# Patient Record
Sex: MALE | Race: ASIAN | NOT HISPANIC OR LATINO | Employment: FULL TIME | URBAN - METROPOLITAN AREA
[De-identification: names, ages, dates, MRNs, and addresses within clinical notes are randomized per-mention and may not be internally consistent; named-entity substitution may affect disease eponyms.]

---

## 2018-06-04 ENCOUNTER — OFFICE VISIT CONVERTED (OUTPATIENT)
Dept: OTHER | Facility: HOSPITAL | Age: 53
End: 2018-06-04
Attending: INTERNAL MEDICINE

## 2019-07-20 ENCOUNTER — HOSPITAL ENCOUNTER (OUTPATIENT)
Dept: OTHER | Facility: HOSPITAL | Age: 54
Discharge: HOME | End: 2019-07-20
Attending: INTERNAL MEDICINE

## 2019-07-20 LAB
ANION GAP SERPL CALC-SCNC: 16 MMOL/L (ref 8–19)
BUN SERPL-MCNC: 18 MG/DL (ref 5–25)
BUN/CREAT SERPL: 20 {RATIO} (ref 6–20)
CALCIUM SERPL-MCNC: 9.1 MG/DL (ref 8.7–10.4)
CHLORIDE SERPL-SCNC: 104 MMOL/L (ref 99–111)
CHOLEST SERPL-MCNC: 186 MG/DL (ref 107–200)
CHOLEST/HDLC SERPL: 4.8 {RATIO} (ref 3–6)
CONV CO2: 25 MMOL/L (ref 22–32)
CREAT UR-MCNC: 0.9 MG/DL (ref 0.7–1.2)
EST. AVERAGE GLUCOSE BLD GHB EST-MCNC: 128 MG/DL
GFR SERPLBLD BASED ON 1.73 SQ M-ARVRAT: >60 ML/MIN/{1.73_M2}
GLUCOSE SERPL-MCNC: 123 MG/DL (ref 70–99)
HBA1C MFR BLD: 6.1 % (ref 3.5–5.7)
HDLC SERPL-MCNC: 39 MG/DL (ref 40–60)
LDLC SERPL CALC-MCNC: 100 MG/DL (ref 70–100)
OSMOLALITY SERPL CALC.SUM OF ELEC: 295 MOSM/KG (ref 273–304)
POTASSIUM SERPL-SCNC: 4.1 MMOL/L (ref 3.5–5.3)
SODIUM SERPL-SCNC: 141 MMOL/L (ref 135–147)
TRIGL SERPL-MCNC: 234 MG/DL (ref 40–150)
VLDLC SERPL-MCNC: 47 MG/DL (ref 5–37)

## 2019-07-30 ENCOUNTER — OFFICE VISIT CONVERTED (OUTPATIENT)
Dept: OTHER | Facility: HOSPITAL | Age: 54
End: 2019-07-30
Attending: INTERNAL MEDICINE

## 2021-05-28 VITALS
RESPIRATION RATE: 16 BRPM | TEMPERATURE: 98.5 F | HEART RATE: 57 BPM | WEIGHT: 154.5 LBS | DIASTOLIC BLOOD PRESSURE: 69 MMHG | BODY MASS INDEX: 26.38 KG/M2 | HEIGHT: 64 IN | TEMPERATURE: 98.2 F | OXYGEN SATURATION: 96 % | OXYGEN SATURATION: 97 % | BODY MASS INDEX: 26.35 KG/M2 | RESPIRATION RATE: 18 BRPM | SYSTOLIC BLOOD PRESSURE: 114 MMHG | SYSTOLIC BLOOD PRESSURE: 120 MMHG | HEIGHT: 64 IN | WEIGHT: 154.37 LBS | HEART RATE: 58 BPM | DIASTOLIC BLOOD PRESSURE: 73 MMHG

## 2021-05-28 NOTE — PROGRESS NOTES
Patient: NHUNG NULL     Acct: [id number]     Report: #[id number]  UNIT #: [id number]     : 1965    Encounter Date:2019  PRIMARY CARE:   ***Signed***  --------------------------------------------------------------------------------------------------------------------  Progress Note      DATE: 19      PCP Not Found in Lookup:  None      Referring Provider:  SELF,REFERRED      Chief Complaint      Follow-up Visit            Subjective      53-year-old Serbian male comes in for yearly checkup.  Patient has history of     hyperglycemia and at one point was treated with glyburide for diabetes mellitus.     Patient also suffers from hyperlipidemia.      Patient offers no complaints.  He would occasionally have some aches and pains     secondary to his work.            Past Med/Surg History            Past Med/Surg History:   No Hypertension             No Diabetes Mellitus             No Heart Disease             No Cancer             Other (Prediabetic)            Social History      Social History:  No Tobacco Use, No Alcohol Use, No Recreational Drug use; Other    (lives with his wife)            Allergies      Coded Allergies:             NO KNOWN ALLERGIES (Unverified , 17)            Medications      Medications    Last Reconciled on 19 16:51 by RIKA NORTON      No Active Prescriptions or Reported Meds      Current Medications      Current Medications Reviewed 19            Pain Assessment      Pain Intensity:  0      Description:  None            Review of Systems      General:  No Fatigue Scale:, No Pain Scale:      HEENT:  No Dysphagia, No Hearing Changes, No Visual Changes      Respiratory:  No Cough, No Shortness of Air      Cardiovascular:  No Chest Pain, No Palpitations      Gastrointestinal:  No Nausea, No Vomiting, No Constipation, No Diarrhea;     Appetite Good      Genitourinary:  No Nocturia, No Dysuria      Musculoskeletal:  No Aches, No Pains       Endocrine:  No Heat Intolerance, No Fatigue      Hematologic:  No Bleeding, No Bruising      Allergic/Immunologic:  No Hives, No Nasal drip      Psychological:  No Anxiety, No Depression      Neurological:  No Headaches, No Dizziness      Skin:  No Rash, No Edema            Vitals      Height 5 ft 4 in / 162.56 cm      Weight 154 lbs 6 oz / 70.549004 kg      BSA 1.75 m2      BMI 26.5 kg/m2      Temperature 98.2 F / 36.78 C      Pulse 57      Respirations 18      Blood Pressure 120/73      Pulse Oximetry 96%            Exam      Constitutional:  No acute distress, Conversant, Pleasant      Eyes:  Anicteric sclerae, Palpebral Conjunctivae (Pink), KEATON      HENT:  Oropharynx clear; No Erythema; Buccal mucosae (pink)      Neck:  Supple, Full Range of Motion      Cardiovascular:  RRR; No Murmurs; Normal PMI; No Peripheral Edema      Lungs:  Clear to Ausculation, Normal Respiratory Effort      Abdomen:  Soft, NABS; No Tenderness      Chest:  Other (Symmetrical and equal)      Lymphatic:  No Neck, No Axillae      Extremities:  No digital cyanosis, No digital ischemia, Normal gait, Other (No     deformity)      Neurological:  Cranial Nerve II-XII (Intact); No Focal Sensory deficits      Psychological:  Appropriate affect, Appropriate mood, Intact judgement, Alert      Skin:  Normal temperature, Other (No dermatosis)            Lab Results      Laboratory Tests      7/20/19 06:25            Laboratory Tests            Test       7/20/19      06:25 7/22/19      21:57             Sodium Level       141 mmol/L      (135-147)                    Potassium Level       4.1 mmol/L      (3.5-5.3)                    Chloride Level       104 mmol/L      ()                    Carbon Dioxide Level       25 mmol/L      (22-32)                    Anion Gap       16 mmol/L      (8-19)                    Blood Urea Nitrogen       18 mg/dL      (5-25)                    Creatinine       0.90 mg/dL      (0.70-1.20)                     Glomerular Filtration Rate      Calc >60      mL/min/{1.73_m2}                    BUN/Creatinine Ratio       20 {ratio}      (6-20)                    Glucose Level       123 mg/dL      (70-99)                    Hemoglobin A1c       6.1 %      (3.5-5.7)                    Estimated Average Glucose      (eAG) 128 mg/dL                           Serum Osmolality 295 (273-304)               Calcium Level       9.1 mg/dL      (8.7-10.4)                    Triglycerides Level       234 mg/dL      ()                    Cholesterol Level       186 mg/dL      (107-200)                    LDL Cholesterol       100 mg/dL      ()                    VLDL Cholesterol       47 mg/dL      (5-37)                    HDL Cholesterol       39 mg/dL      (40-60)                    Cholesterol/HDL Ratio       4.8 NA      (3.0-6.0)                    Lab Scanned Report              LAB FINAL      CUMULATIVES -            Impression/Problem List            Hyperglycemia high risk for diabetes mellitus      Hyper triglyceridemia            Plan      Low carbohydrate diet -discussed with him that bread, rice, spaghetti are all     carbohydrates and that he should eat less of these.      Exercise      Lose weight      Yearly checkup            Patient Education:        General Wellness (Alternative Therapy)            PREVENTION      Influenza Vaccine Declined:  No      2 or More Falls Past Year?:  No      Fall Past Year with Injury?:  No      Encouraged to follow-up with:  PCP regarding preventative exams.      Chart initiated by      REANNA Vincent RN                 Disclaimer: Converted document may not contain table formatting or lab diagrams. Please see [employer] for the authenticated document.

## 2021-05-28 NOTE — PROGRESS NOTES
Patient: NHUNG NULL     Acct: [id number]     Report: #[id number]  UNIT #: [id number]     : 1965    Encounter Date:2018  PRIMARY CARE:   ***Signed***  --------------------------------------------------------------------------------------------------------------------  Progress Note      DATE: 18      PCP Not Found in Lookup:  None      Referring Provider:  SELF,REFERRED PATIENT      Chief Complaint      Follow up Hyperglycemia            Subjective      52-year-old Italian male comes in for follow-up of hyperglycemia and     hyperlipidemia.  Patient had refused medications for his hyperlipidemia and     went on a diet.  Patient was on glyburide 2.5 mg for hyperglycemia which he     took according to his blood sugar.  When his blood sugar is 90, patient does     not take his glyburide.            Past Med/Surg History            Past Med/Surg History:   No Hypertension             No Diabetes Mellitus             No Heart Disease             No Cancer            Social History      Social History:  No Tobacco Use, No Alcohol Use, No Recreational Drug use,     Other (lives with his wife)            Allergies      Coded Allergies:             NO KNOWN ALLERGIES (Unverified , 17)            Medications      Medications    Last Reconciled on 17 19:20 by CATARINO ROUSE MD      glyBURIDE (glyBURIDE) 2.5 Mg Tablet      2.5 MG PO Q2D, #30 TAB 0 Refills         Reported         18      Current Medications      Current Medications Reviewed 18            Pain Assessment      Pain Intensity:  0      Description:  None            Review of Systems      General:  No Anxiety, No Fatigue Scale:, No Pain Scale:, No Fever, No Other      HEENT:  No Dysphagia, No Hearing Changes, No Rhinorrhea, No Tinnitus, No Visual     Changes, No Nasal Congestion, No Epistaxis, No Other      Respiratory:  No Cough, No Shortness of Air, No Sputum Changes, No Wheezing, No     Hemoptysis, No Congestion, No Other       Cardiovascular:  No Chest Pain, No Pedal Edema, No Orthopnea, No Palpitations,     No Chest Pressure, No Dizziness, No Other      Gastrointestinal:  No Nausea, No Vomiting, No Dysphagia, No Constipation, No     Diarrhea, Appetite Good, No Appetite Fair, No Appetite Poor, No Early Satiety,     No Other      Genitourinary:  No Nocturia, No Dysuria, No Other      Musculoskeletal:  No Joint Effusions, No Joint Tenderness, No Joint Stiffness,     No Myalgias, No Aches, No Pains, No Other      Endocrine:  No Heat Intolerance, No Cold Intolerance, No Fatigue, No Blood     Sugar Control, No Other      Hematologic:  No Bleeding, No Bruising, No Swollen Glands, No Other      Allergic/Immunologic:  No Hives, No Throat closing off, No Nasal drip, No Itchy     eyes, No Hay fever, No Other      Psychological:  No Anxiety, No Depression, No Other      Neurological:  No Headaches, No Dizziness, No Weakness, No Numbness, No Other      Skin:  No Rash, No Open Wounds, No Edema, No Other            Vitals      Height 5 ft 4 in / 162.56 cm      Weight 154 lbs 8 oz / 70.206101 kg      BSA 1.79 m2      BMI 26.5 kg/m2      Temperature 98.5 F / 36.94 C - Oral      Pulse 58      Respirations 16      Blood Pressure 114/69 Sitting      Pulse Oximetry 97%, room air            Exam      Constitutional:  No acute distress, Conversant, Pleasant      Eyes:  Anicteric sclerae, Palpebral Conjunctivae (pink), KEATON      HENT:  Oropharynx clear, No Erythema, Buccal mucosae (pink)      Neck:  Supple      Cardiovascular:  RRR, Normal PMI, No Peripheral Edema      Lungs:  Clear to Ausculation, Normal Respiratory Effort      Abdomen:  Soft, NABS, No Tenderness      Chest:  Other (symmetrical and equal)      Lymphatic:  No Neck      Extremities:  No digital cyanosis, Normal gait, Other (no deformity, no     mutation range of motion)      Neurological:  Cranial Nerve II-XII, No Focal Sensory deficits      Psychological:  Appropriate affect, Intact  judgement, Alert      Skin:  Normal temperature, Other (no dermatosis)            Lab Results            Laboratory Tests            Test        5/19/18      06:22 5/21/18      21:56             Hemoglobin A1c        5.8 %      (3.5-5.7)              Estimated Average Glucose      (eAG) 120 mg/dL                      Triglycerides Level        176 mg/dL      ()              Cholesterol Level        183 mg/dL      (107-200)              LDL Cholesterol        107 mg/dL      ()              VLDL Cholesterol        35 mg/dL      (5-37)              HDL Cholesterol        41 mg/dL      (40-60)              Cholesterol/HDL Ratio        4.5 NA      (3.0-6.0)              Lab Scanned Report                LAB FINAL      CUMULATIVES -            Impression/Problem List      Thrombocytopenia, mild resolved      Hyperglycemia hemoglobin A1c improved      Hyperlipidemia -  lipid profile improved with diet      Notes      New Medications      * glyBURIDE 2.5 MG TABLET: 2.5 MG PO Q2D #30            Plan      Low carbohydrate diet -discussed with him that bread, rice, spaghetti are all     carbohydrates and that he should eat less of these.      Exercise      Lose weight at least 10 pounds      6 months lipid profile, hemoglobin A1c, BMP      Because of a strong family history of diabetes mellitus, relatively controlled     diet and persistently elevated hemoglobin A1c patient was started on glyburide     2.5 mg per day which she takes according to his blood sugars      Instructed to take more vegetables and fish rather than pork products.            Patient Education:        High Blood Pressure            PREVENTION      Hx Influenza Vaccination:  Yes      Influenza Vaccine Declined:  No      Hx Pneumococcal Vaccination:  No      Encouraged to follow-up with:  PCP regarding preventative exams.      Chart initiated by      Josiane Britton MA                 Disclaimer: Converted document may not contain table formatting  or lab diagrams. Please see [employer] for the authenticated document.

## 2021-06-16 ENCOUNTER — TELEPHONE (OUTPATIENT)
Dept: GASTROENTEROLOGY | Facility: CLINIC | Age: 56
End: 2021-06-16

## 2021-07-08 ENCOUNTER — CLINICAL SUPPORT (OUTPATIENT)
Dept: GASTROENTEROLOGY | Facility: CLINIC | Age: 56
End: 2021-07-08

## 2021-07-08 ENCOUNTER — PREP FOR SURGERY (OUTPATIENT)
Dept: OTHER | Facility: HOSPITAL | Age: 56
End: 2021-07-08

## 2021-07-08 DIAGNOSIS — Z12.11 SCREEN FOR COLON CANCER: Primary | ICD-10-CM

## 2021-07-08 PROBLEM — E78.1 HYPERTRIGLYCERIDEMIA: Status: ACTIVE | Noted: 2021-07-08

## 2021-07-08 PROBLEM — R21 RASH: Status: ACTIVE | Noted: 2021-07-08

## 2021-07-08 PROBLEM — Z12.5 SCREENING FOR MALIGNANT NEOPLASM OF PROSTATE: Status: ACTIVE | Noted: 2021-07-08

## 2021-07-08 PROBLEM — J30.2 SEASONAL ALLERGIES: Status: ACTIVE | Noted: 2021-07-08

## 2021-07-08 PROBLEM — R73.9 HYPERGLYCEMIA: Status: ACTIVE | Noted: 2021-07-08

## 2021-07-08 PROBLEM — L30.9 ECZEMA: Status: ACTIVE | Noted: 2021-07-08

## 2021-07-08 RX ORDER — POLYETHYLENE GLYCOL 3350, SODIUM SULFATE, SODIUM CHLORIDE, POTASSIUM CHLORIDE, ASCORBIC ACID, SODIUM ASCORBATE 140-9-5.2G
140 KIT ORAL DAILY
Qty: 1 EACH | Refills: 0 | Status: ON HOLD | OUTPATIENT
Start: 2021-07-08 | End: 2022-02-11

## 2021-07-08 RX ORDER — FENOFIBRATE 48 MG/1
48 TABLET, COATED ORAL DAILY
COMMUNITY
Start: 2021-06-20

## 2021-07-08 RX ORDER — MULTIPLE VITAMINS W/ MINERALS TAB 9MG-400MCG
1 TAB ORAL DAILY
COMMUNITY

## 2021-07-09 PROBLEM — Z12.11 SCREEN FOR COLON CANCER: Status: ACTIVE | Noted: 2021-07-09

## 2021-09-23 ENCOUNTER — TELEPHONE (OUTPATIENT)
Dept: GASTROENTEROLOGY | Facility: CLINIC | Age: 56
End: 2021-09-23

## 2021-09-23 NOTE — TELEPHONE ENCOUNTER
Due to covid, patient procedure will be cancelled and added to a list and will be rescheduled at a later time. Left message with patient requesting a call back.

## 2021-09-24 NOTE — TELEPHONE ENCOUNTER
Due to covid, patient procedure will be cancelled and added to a list and will be rescheduled at a later time. Patient informed of this information. Daughter will inform this patient.

## 2021-11-03 ENCOUNTER — TELEPHONE (OUTPATIENT)
Dept: GASTROENTEROLOGY | Facility: CLINIC | Age: 56
End: 2021-11-03

## 2022-02-11 ENCOUNTER — ANESTHESIA EVENT (OUTPATIENT)
Dept: GASTROENTEROLOGY | Facility: HOSPITAL | Age: 57
End: 2022-02-11

## 2022-02-11 ENCOUNTER — HOSPITAL ENCOUNTER (OUTPATIENT)
Facility: HOSPITAL | Age: 57
Setting detail: HOSPITAL OUTPATIENT SURGERY
Discharge: HOME | End: 2022-02-11
Attending: INTERNAL MEDICINE | Admitting: INTERNAL MEDICINE

## 2022-02-11 ENCOUNTER — ANESTHESIA (OUTPATIENT)
Dept: GASTROENTEROLOGY | Facility: HOSPITAL | Age: 57
End: 2022-02-11

## 2022-02-11 VITALS
WEIGHT: 156.53 LBS | HEIGHT: 67 IN | TEMPERATURE: 97.3 F | OXYGEN SATURATION: 100 % | DIASTOLIC BLOOD PRESSURE: 74 MMHG | BODY MASS INDEX: 24.57 KG/M2 | RESPIRATION RATE: 15 BRPM | SYSTOLIC BLOOD PRESSURE: 109 MMHG | HEART RATE: 56 BPM

## 2022-02-11 PROCEDURE — 45378 DIAGNOSTIC COLONOSCOPY: CPT | Performed by: INTERNAL MEDICINE

## 2022-02-11 PROCEDURE — 25010000002 PROPOFOL 10 MG/ML EMULSION

## 2022-02-11 RX ORDER — LIDOCAINE HYDROCHLORIDE 20 MG/ML
INJECTION, SOLUTION INFILTRATION; PERINEURAL AS NEEDED
Status: DISCONTINUED | OUTPATIENT
Start: 2022-02-11 | End: 2022-02-11 | Stop reason: SURG

## 2022-02-11 RX ORDER — PROPOFOL 10 MG/ML
VIAL (ML) INTRAVENOUS AS NEEDED
Status: DISCONTINUED | OUTPATIENT
Start: 2022-02-11 | End: 2022-02-11 | Stop reason: SURG

## 2022-02-11 RX ORDER — SODIUM CHLORIDE, SODIUM LACTATE, POTASSIUM CHLORIDE, CALCIUM CHLORIDE 600; 310; 30; 20 MG/100ML; MG/100ML; MG/100ML; MG/100ML
30 INJECTION, SOLUTION INTRAVENOUS CONTINUOUS
Status: DISCONTINUED | OUTPATIENT
Start: 2022-02-11 | End: 2022-02-11 | Stop reason: HOSPADM

## 2022-02-11 RX ADMIN — PROPOFOL 100 MG: 10 INJECTION, EMULSION INTRAVENOUS at 08:10

## 2022-02-11 RX ADMIN — LIDOCAINE HYDROCHLORIDE 100 MG: 20 INJECTION, SOLUTION INFILTRATION; PERINEURAL at 08:10

## 2022-02-11 RX ADMIN — SODIUM CHLORIDE, POTASSIUM CHLORIDE, SODIUM LACTATE AND CALCIUM CHLORIDE 30 ML/HR: 600; 310; 30; 20 INJECTION, SOLUTION INTRAVENOUS at 08:05

## 2022-02-11 RX ADMIN — PROPOFOL 250 MCG/KG/MIN: 10 INJECTION, EMULSION INTRAVENOUS at 08:10

## 2022-02-11 NOTE — H&P
"Pre Procedure History & Physical    Chief Complaint:   Screening colonoscopy    Subjective     HPI:   57 yo M here for screening colonoscopy.    Past Medical History:   Past Medical History:   Diagnosis Date   • Hyperlipidemia        Past Surgical History:  History reviewed. No pertinent surgical history.    Family History:  Family History   Problem Relation Age of Onset   • Diabetes Mother    • Stroke Brother        Social History:   reports that he has quit smoking. He has never used smokeless tobacco. He reports current alcohol use. He reports that he does not use drugs.    Medications:   Medications Prior to Admission   Medication Sig Dispense Refill Last Dose   • fenofibrate (TRICOR) 48 MG tablet Take 48 mg by mouth Daily. FOR 30 DAYS   2/9/2022 at Unknown time   • multivitamin with minerals (CENTRUM ADULTS PO) Take 1 tablet by mouth Daily.   2/9/2022 at Unknown time   • Cetirizine HCl 10 MG/ML solution Daily.          Allergies:  Patient has no known allergies.    ROS:    Pertinent items are noted in HPI     Objective     Blood pressure 121/79, pulse 52, temperature 97.9 °F (36.6 °C), temperature source Temporal, resp. rate 18, height 170.2 cm (67.01\"), weight 71 kg (156 lb 8.4 oz), SpO2 95 %.    Physical Exam   Constitutional: Pt is oriented to person, place, and time and well-developed, well-nourished, and in no distress.   Mouth/Throat: Oropharynx is clear and moist.   Neck: Normal range of motion.   Cardiovascular: Normal rate, regular rhythm and normal heart sounds.    Pulmonary/Chest: Effort normal and breath sounds normal.   Abdominal: Soft. Nontender  Skin: Skin is warm and dry.   Psychiatric: Mood, memory, affect and judgment normal.     Assessment/Plan     Diagnosis:  Screening colonoscopy    Anticipated Surgical Procedure:  Colonoscopy    The risks, benefits, and alternatives of this procedure have been discussed with the patient or the responsible party- the patient understands and agrees to " proceed.

## 2022-02-11 NOTE — ANESTHESIA PREPROCEDURE EVALUATION
Anesthesia Evaluation     Patient summary reviewed and Nursing notes reviewed   no history of anesthetic complications:  NPO Solid Status: > 8 hours  NPO Liquid Status: > 2 hours           Airway   Mallampati: II  TM distance: >3 FB  Neck ROM: full  No difficulty expected  Dental      Pulmonary - negative pulmonary ROS and normal exam    breath sounds clear to auscultation  Cardiovascular - normal exam  Exercise tolerance: good (4-7 METS)    Rhythm: regular  Rate: normal    (+) hyperlipidemia,       Neuro/Psych- negative ROS  GI/Hepatic/Renal/Endo - negative ROS     Musculoskeletal (-) negative ROS    Abdominal    Substance History - negative use     OB/GYN negative ob/gyn ROS         Other - negative ROS                       Anesthesia Plan    ASA 2     general   (Total IV Anesthesia    Patient understands anesthesia not responsible for dental damage.  )  intravenous induction     Anesthetic plan, all risks, benefits, and alternatives have been provided, discussed and informed consent has been obtained with: patient.    Plan discussed with CRNA.        CODE STATUS:

## 2022-02-11 NOTE — ANESTHESIA POSTPROCEDURE EVALUATION
Patient: Kristen Metzger    Procedure Summary     Date: 02/11/22 Room / Location: [facility] ENDOSCOPY 1 / [facility] ENDOSCOPY    Anesthesia Start: 0808 Anesthesia Stop: 0829    Procedure: COLONOSCOPY (N/A ) Diagnosis:       Screen for colon cancer      (Screen for colon cancer [Z12.11])    Surgeons: Anna Hoskins MD Provider: Guillermo Sheldon MD    Anesthesia Type: general ASA Status: 2          Anesthesia Type: general    Vitals  Vitals Value Taken Time   /84 02/11/22 0833   Temp     Pulse 62 02/11/22 0834   Resp     SpO2 100 % 02/11/22 0834   Vitals shown include unvalidated device data.        Post Anesthesia Care and Evaluation    Patient location during evaluation: bedside  Patient participation: complete - patient participated  Level of consciousness: awake  Pain score: 0  Pain management: adequate  Airway patency: patent  Anesthetic complications: No anesthetic complications  PONV Status: none  Cardiovascular status: acceptable and stable  Respiratory status: acceptable and room air  Hydration status: acceptable    Comments: An Anesthesiologist personally participated in the most demanding procedures (including induction and emergence if applicable) in the anesthesia plan, monitored the course of anesthesia administration at frequent intervals and remained physically present and available for immediate diagnosis and treatment of emergencies.

## 2022-02-14 ENCOUNTER — TELEPHONE (OUTPATIENT)
Dept: GASTROENTEROLOGY | Facility: CLINIC | Age: 57
End: 2022-02-14

## 2023-07-20 ENCOUNTER — TRANSCRIBE ORDERS (OUTPATIENT)
Dept: LAB | Facility: HOSPITAL | Age: 58
End: 2023-07-20
Payer: COMMERCIAL

## 2023-07-20 DIAGNOSIS — E78.1 HYPERGLYCERIDEMIA: ICD-10-CM

## 2023-07-20 DIAGNOSIS — R73.9 HYPERGLYCEMIA: ICD-10-CM

## 2023-07-20 DIAGNOSIS — Z12.5 SPECIAL SCREENING FOR MALIGNANT NEOPLASM OF PROSTATE: Primary | ICD-10-CM

## 2023-07-28 ENCOUNTER — LAB (OUTPATIENT)
Dept: LAB | Facility: HOSPITAL | Age: 58
End: 2023-07-28
Payer: COMMERCIAL

## 2023-07-28 DIAGNOSIS — R73.9 HYPERGLYCEMIA: ICD-10-CM

## 2023-07-28 DIAGNOSIS — Z12.5 SPECIAL SCREENING FOR MALIGNANT NEOPLASM OF PROSTATE: ICD-10-CM

## 2023-07-28 DIAGNOSIS — E78.1 HYPERGLYCERIDEMIA: ICD-10-CM

## 2023-07-28 LAB
ALBUMIN SERPL-MCNC: 4.4 G/DL (ref 3.5–5.2)
ALBUMIN/GLOB SERPL: 1.6 G/DL
ALP SERPL-CCNC: 46 U/L (ref 39–117)
ALT SERPL W P-5'-P-CCNC: 20 U/L (ref 1–41)
ANION GAP SERPL CALCULATED.3IONS-SCNC: 9 MMOL/L (ref 5–15)
AST SERPL-CCNC: 22 U/L (ref 1–40)
BASOPHILS # BLD AUTO: 0.05 10*3/MM3 (ref 0–0.2)
BASOPHILS NFR BLD AUTO: 0.8 % (ref 0–1.5)
BILIRUB SERPL-MCNC: 0.9 MG/DL (ref 0–1.2)
BUN SERPL-MCNC: 18 MG/DL (ref 6–20)
BUN/CREAT SERPL: 19.6 (ref 7–25)
CALCIUM SPEC-SCNC: 9.5 MG/DL (ref 8.6–10.5)
CHLORIDE SERPL-SCNC: 104 MMOL/L (ref 98–107)
CHOLEST SERPL-MCNC: 185 MG/DL (ref 0–200)
CO2 SERPL-SCNC: 26 MMOL/L (ref 22–29)
CREAT SERPL-MCNC: 0.92 MG/DL (ref 0.76–1.27)
DEPRECATED RDW RBC AUTO: 43 FL (ref 37–54)
EGFRCR SERPLBLD CKD-EPI 2021: 97.6 ML/MIN/1.73
EOSINOPHIL # BLD AUTO: 0.22 10*3/MM3 (ref 0–0.4)
EOSINOPHIL NFR BLD AUTO: 3.4 % (ref 0.3–6.2)
ERYTHROCYTE [DISTWIDTH] IN BLOOD BY AUTOMATED COUNT: 13.2 % (ref 12.3–15.4)
GLOBULIN UR ELPH-MCNC: 2.7 GM/DL
GLUCOSE SERPL-MCNC: 126 MG/DL (ref 65–99)
HBA1C MFR BLD: 6.6 % (ref 4.8–5.6)
HCT VFR BLD AUTO: 43.1 % (ref 37.5–51)
HDLC SERPL-MCNC: 46 MG/DL (ref 40–60)
HGB BLD-MCNC: 14.6 G/DL (ref 13–17.7)
IMM GRANULOCYTES # BLD AUTO: 0.01 10*3/MM3 (ref 0–0.05)
IMM GRANULOCYTES NFR BLD AUTO: 0.2 % (ref 0–0.5)
LDLC SERPL CALC-MCNC: 114 MG/DL (ref 0–100)
LDLC/HDLC SERPL: 2.42 {RATIO}
LYMPHOCYTES # BLD AUTO: 3.39 10*3/MM3 (ref 0.7–3.1)
LYMPHOCYTES NFR BLD AUTO: 51.9 % (ref 19.6–45.3)
MCH RBC QN AUTO: 30.6 PG (ref 26.6–33)
MCHC RBC AUTO-ENTMCNC: 33.9 G/DL (ref 31.5–35.7)
MCV RBC AUTO: 90.4 FL (ref 79–97)
MONOCYTES # BLD AUTO: 0.56 10*3/MM3 (ref 0.1–0.9)
MONOCYTES NFR BLD AUTO: 8.6 % (ref 5–12)
NEUTROPHILS NFR BLD AUTO: 2.3 10*3/MM3 (ref 1.7–7)
NEUTROPHILS NFR BLD AUTO: 35.1 % (ref 42.7–76)
NRBC BLD AUTO-RTO: 0 /100 WBC (ref 0–0.2)
PLATELET # BLD AUTO: 163 10*3/MM3 (ref 140–450)
PMV BLD AUTO: 11.4 FL (ref 6–12)
POTASSIUM SERPL-SCNC: 4.1 MMOL/L (ref 3.5–5.2)
PROT SERPL-MCNC: 7.1 G/DL (ref 6–8.5)
PSA SERPL-MCNC: 0.61 NG/ML (ref 0–4)
RBC # BLD AUTO: 4.77 10*6/MM3 (ref 4.14–5.8)
SODIUM SERPL-SCNC: 139 MMOL/L (ref 136–145)
TRIGL SERPL-MCNC: 138 MG/DL (ref 0–150)
VLDLC SERPL-MCNC: 25 MG/DL (ref 5–40)
WBC NRBC COR # BLD: 6.53 10*3/MM3 (ref 3.4–10.8)

## 2023-07-28 PROCEDURE — G0103 PSA SCREENING: HCPCS

## 2023-07-28 PROCEDURE — 36415 COLL VENOUS BLD VENIPUNCTURE: CPT

## 2023-07-28 PROCEDURE — 83036 HEMOGLOBIN GLYCOSYLATED A1C: CPT

## 2023-07-28 PROCEDURE — 80061 LIPID PANEL: CPT

## 2023-07-28 PROCEDURE — 85025 COMPLETE CBC W/AUTO DIFF WBC: CPT

## 2023-07-28 PROCEDURE — 80053 COMPREHEN METABOLIC PANEL: CPT

## 2023-11-30 ENCOUNTER — OFFICE VISIT (OUTPATIENT)
Dept: INTERNAL MEDICINE | Facility: CLINIC | Age: 58
End: 2023-11-30
Payer: COMMERCIAL

## 2023-11-30 VITALS
BODY MASS INDEX: 26.98 KG/M2 | RESPIRATION RATE: 14 BRPM | HEIGHT: 64 IN | WEIGHT: 158 LBS | HEART RATE: 61 BPM | TEMPERATURE: 99.1 F | DIASTOLIC BLOOD PRESSURE: 80 MMHG | SYSTOLIC BLOOD PRESSURE: 120 MMHG | OXYGEN SATURATION: 96 %

## 2023-11-30 DIAGNOSIS — M79.671 CHRONIC PAIN OF BOTH FEET: ICD-10-CM

## 2023-11-30 DIAGNOSIS — G89.29 CHRONIC PAIN OF BOTH FEET: ICD-10-CM

## 2023-11-30 DIAGNOSIS — E78.00 PURE HYPERCHOLESTEROLEMIA: ICD-10-CM

## 2023-11-30 DIAGNOSIS — E55.9 VITAMIN D DEFICIENCY: ICD-10-CM

## 2023-11-30 DIAGNOSIS — I10 PRIMARY HYPERTENSION: ICD-10-CM

## 2023-11-30 DIAGNOSIS — E11.65 TYPE 2 DIABETES MELLITUS WITH HYPERGLYCEMIA, WITHOUT LONG-TERM CURRENT USE OF INSULIN: Primary | ICD-10-CM

## 2023-11-30 DIAGNOSIS — J30.1 SEASONAL ALLERGIC RHINITIS DUE TO POLLEN: ICD-10-CM

## 2023-11-30 DIAGNOSIS — Z23 NEED FOR VACCINATION: ICD-10-CM

## 2023-11-30 DIAGNOSIS — Z11.59 NEED FOR HEPATITIS C SCREENING TEST: ICD-10-CM

## 2023-11-30 DIAGNOSIS — M79.672 CHRONIC PAIN OF BOTH FEET: ICD-10-CM

## 2023-11-30 LAB
25(OH)D3 SERPL-MCNC: 35.3 NG/ML (ref 30–100)
ALBUMIN SERPL-MCNC: 5.1 G/DL (ref 3.5–5.2)
ALBUMIN UR-MCNC: <1.2 MG/DL
ALBUMIN/GLOB SERPL: 2 G/DL
ALP SERPL-CCNC: 59 U/L (ref 39–117)
ALT SERPL W P-5'-P-CCNC: 34 U/L (ref 1–41)
ANION GAP SERPL CALCULATED.3IONS-SCNC: 12 MMOL/L (ref 5–15)
AST SERPL-CCNC: 29 U/L (ref 1–40)
BASOPHILS # BLD AUTO: 0.04 10*3/MM3 (ref 0–0.2)
BASOPHILS NFR BLD AUTO: 0.5 % (ref 0–1.5)
BILIRUB SERPL-MCNC: 0.6 MG/DL (ref 0–1.2)
BUN SERPL-MCNC: 20 MG/DL (ref 6–20)
BUN/CREAT SERPL: 18 (ref 7–25)
CALCIUM SPEC-SCNC: 9.7 MG/DL (ref 8.6–10.5)
CHLORIDE SERPL-SCNC: 103 MMOL/L (ref 98–107)
CHOLEST SERPL-MCNC: 228 MG/DL (ref 0–200)
CO2 SERPL-SCNC: 26 MMOL/L (ref 22–29)
CREAT SERPL-MCNC: 1.11 MG/DL (ref 0.76–1.27)
CREAT UR-MCNC: 105.4 MG/DL
DEPRECATED RDW RBC AUTO: 41.3 FL (ref 37–54)
EGFRCR SERPLBLD CKD-EPI 2021: 77.5 ML/MIN/1.73
EOSINOPHIL # BLD AUTO: 0.28 10*3/MM3 (ref 0–0.4)
EOSINOPHIL NFR BLD AUTO: 3.6 % (ref 0.3–6.2)
ERYTHROCYTE [DISTWIDTH] IN BLOOD BY AUTOMATED COUNT: 12.9 % (ref 12.3–15.4)
GLOBULIN UR ELPH-MCNC: 2.6 GM/DL
GLUCOSE SERPL-MCNC: 110 MG/DL (ref 65–99)
HBA1C MFR BLD: 6.9 % (ref 4.8–5.6)
HCT VFR BLD AUTO: 44.4 % (ref 37.5–51)
HCV AB SER DONR QL: NORMAL
HDLC SERPL-MCNC: 45 MG/DL (ref 40–60)
HGB BLD-MCNC: 14.6 G/DL (ref 13–17.7)
IMM GRANULOCYTES # BLD AUTO: 0.01 10*3/MM3 (ref 0–0.05)
IMM GRANULOCYTES NFR BLD AUTO: 0.1 % (ref 0–0.5)
LDLC SERPL CALC-MCNC: 123 MG/DL (ref 0–100)
LDLC/HDLC SERPL: 2.56 {RATIO}
LYMPHOCYTES # BLD AUTO: 2.91 10*3/MM3 (ref 0.7–3.1)
LYMPHOCYTES NFR BLD AUTO: 37.2 % (ref 19.6–45.3)
MAGNESIUM SERPL-MCNC: 2.3 MG/DL (ref 1.6–2.6)
MCH RBC QN AUTO: 29 PG (ref 26.6–33)
MCHC RBC AUTO-ENTMCNC: 32.9 G/DL (ref 31.5–35.7)
MCV RBC AUTO: 88.3 FL (ref 79–97)
MICROALBUMIN/CREAT UR: NORMAL MG/G{CREAT}
MONOCYTES # BLD AUTO: 0.61 10*3/MM3 (ref 0.1–0.9)
MONOCYTES NFR BLD AUTO: 7.8 % (ref 5–12)
NEUTROPHILS NFR BLD AUTO: 3.97 10*3/MM3 (ref 1.7–7)
NEUTROPHILS NFR BLD AUTO: 50.8 % (ref 42.7–76)
NRBC BLD AUTO-RTO: 0 /100 WBC (ref 0–0.2)
PLATELET # BLD AUTO: 168 10*3/MM3 (ref 140–450)
PMV BLD AUTO: 12.6 FL (ref 6–12)
POTASSIUM SERPL-SCNC: 3.7 MMOL/L (ref 3.5–5.2)
PROT SERPL-MCNC: 7.7 G/DL (ref 6–8.5)
RBC # BLD AUTO: 5.03 10*6/MM3 (ref 4.14–5.8)
SODIUM SERPL-SCNC: 141 MMOL/L (ref 136–145)
T3FREE SERPL-MCNC: 3.15 PG/ML (ref 2–4.4)
T4 FREE SERPL-MCNC: 1.04 NG/DL (ref 0.93–1.7)
TRIGL SERPL-MCNC: 340 MG/DL (ref 0–150)
TSH SERPL DL<=0.05 MIU/L-ACNC: 0.61 UIU/ML (ref 0.27–4.2)
VIT B12 BLD-MCNC: 802 PG/ML (ref 211–946)
VLDLC SERPL-MCNC: 60 MG/DL (ref 5–40)
WBC NRBC COR # BLD AUTO: 7.82 10*3/MM3 (ref 3.4–10.8)

## 2023-11-30 PROCEDURE — 82607 VITAMIN B-12: CPT | Performed by: INTERNAL MEDICINE

## 2023-11-30 PROCEDURE — 82043 UR ALBUMIN QUANTITATIVE: CPT | Performed by: INTERNAL MEDICINE

## 2023-11-30 PROCEDURE — 84439 ASSAY OF FREE THYROXINE: CPT | Performed by: INTERNAL MEDICINE

## 2023-11-30 PROCEDURE — 82306 VITAMIN D 25 HYDROXY: CPT | Performed by: INTERNAL MEDICINE

## 2023-11-30 PROCEDURE — 83735 ASSAY OF MAGNESIUM: CPT | Performed by: INTERNAL MEDICINE

## 2023-11-30 PROCEDURE — 80061 LIPID PANEL: CPT | Performed by: INTERNAL MEDICINE

## 2023-11-30 PROCEDURE — 85025 COMPLETE CBC W/AUTO DIFF WBC: CPT | Performed by: INTERNAL MEDICINE

## 2023-11-30 PROCEDURE — 84481 FREE ASSAY (FT-3): CPT | Performed by: INTERNAL MEDICINE

## 2023-11-30 PROCEDURE — 82746 ASSAY OF FOLIC ACID SERUM: CPT | Performed by: INTERNAL MEDICINE

## 2023-11-30 PROCEDURE — 86803 HEPATITIS C AB TEST: CPT | Performed by: INTERNAL MEDICINE

## 2023-11-30 PROCEDURE — 82570 ASSAY OF URINE CREATININE: CPT | Performed by: INTERNAL MEDICINE

## 2023-11-30 PROCEDURE — 83036 HEMOGLOBIN GLYCOSYLATED A1C: CPT | Performed by: INTERNAL MEDICINE

## 2023-11-30 PROCEDURE — 84443 ASSAY THYROID STIM HORMONE: CPT | Performed by: INTERNAL MEDICINE

## 2023-11-30 PROCEDURE — 80053 COMPREHEN METABOLIC PANEL: CPT | Performed by: INTERNAL MEDICINE

## 2023-11-30 RX ORDER — METFORMIN HYDROCHLORIDE 500 MG/1
TABLET, EXTENDED RELEASE ORAL
Qty: 60 TABLET | Refills: 0 | Status: SHIPPED | OUTPATIENT
Start: 2023-11-30

## 2023-11-30 RX ORDER — ATORVASTATIN CALCIUM 10 MG/1
10 TABLET, FILM COATED ORAL DAILY
Qty: 30 TABLET | Refills: 1 | Status: SHIPPED | OUTPATIENT
Start: 2023-11-30 | End: 2023-11-30 | Stop reason: SDUPTHER

## 2023-11-30 RX ORDER — FENOFIBRATE 48 MG/1
48 TABLET, COATED ORAL DAILY
Qty: 90 TABLET | Refills: 3 | Status: SHIPPED | OUTPATIENT
Start: 2023-11-30

## 2023-11-30 RX ORDER — ATORVASTATIN CALCIUM 10 MG/1
10 TABLET, FILM COATED ORAL DAILY
Qty: 30 TABLET | Refills: 1 | Status: SHIPPED | OUTPATIENT
Start: 2023-11-30

## 2023-11-30 NOTE — ASSESSMENT & PLAN NOTE
Diabetes is not improved A1c equals 6.5 June 2023 states blood sugars are ranging from 1 20-1 28 no low blood sugar.  Not on any medications states following a lower carb diet and not eating as much rice    Plan check A1c today goal is less than 6 with readings above 6 the last several years we will start metformin  mg 1 daily monitor for any diarrhea.  Start aspirin 81 mg 1 daily

## 2023-11-30 NOTE — ASSESSMENT & PLAN NOTE
Lipid abnormalities with LDL not at goal of less than 70 and diabetic.    Plan start atorvastatin 10 mg 1 daily monitor for any myalgias check lipids today goal LDL is less than 70.

## 2023-11-30 NOTE — ASSESSMENT & PLAN NOTE
Hypertension is stable.  Goal is less than 130/80.  Currently not on any medications.    Plan-monitor blood pressure goal is less than 130/80 specially in diabetic.  If blood pressure starts to rise will add ACE or ARB

## 2023-11-30 NOTE — PROGRESS NOTES
CHIEF COMPLAINT  Kristen Metzger presents to [facility] to Establish Care (59 yo male is here to establish care. He wants refills on his medications. He's having feet pain in his heels from standing on his feet all day working, and is worried about bone spurs. No leg or feet swelling. )     HPI  Here with daughter-Anle  58-year-old male patient here to establish care.  PCP was Dr. VIVIAN Meyer    Records reviewed, meds reviewed in detail, labs reviewed with patient.  Plan of care is as follows below.    Main concern is pain in his feet from standing all day at work-works at [employer] -stands all day for 24 yrs    DM-[id number]- A1c July 28, 2023 equals 6.6 consistent with diabetes-no meds-start ASA 81 mg    Cholesterol-LDL equals 114 cholesterol 185 and HDL 46-LDL goal is less than 70 since with diabetes    Past Medical history significant for hyperlipidemia-on fenofibrate, allergic rhinitis, diabetes mellitus    SH-works at [employer] x 20+ yrs-drinks socially-smoked cigs for 34 yrs < 1/2 PPD-quit 6 yrs ago    Past History:  Allergies: Patient has no known allergies.   Medical History: has a past medical history of Hyperlipidemia and Prediabetes (07/01/2023).   Surgical History: has a past surgical history that includes Colonoscopy (N/A, 2/11/2022).   Family History: family history includes Diabetes in his brother and mother; Hypertension in his mother.   Social History: reports that he quit smoking about 6 years ago. His smoking use included cigarettes. He started smoking about 53 years ago. He smoked an average of .25 packs per day. He has never used smokeless tobacco. He reports current alcohol use. He reports that he does not use drugs.  Social History     Social History Narrative    Not on file         Current Outpatient Medications:     atorvastatin (LIPITOR) 10 MG tablet, Take 1 tablet by mouth Daily., Disp: 30 tablet, Rfl: 1    Cetirizine HCl 10 MG/ML solution, Take 10 mg by mouth  "Daily., Disp: 30 mL, Rfl: 3    fenofibrate (TRICOR) 48 MG tablet, Take 1 tablet by mouth Daily. FOR 30 DAYS, Disp: 90 tablet, Rfl: 3    multivitamin with minerals (CENTRUM ADULTS PO), Take 1 tablet by mouth Daily., Disp: , Rfl:     metFORMIN ER (GLUCOPHAGE-XR) 500 MG 24 hr tablet, 2 tabs po daily, Disp: 60 tablet, Rfl: 0     OBJECTIVE  Vital Signs  Vitals:    11/30/23 1428 11/30/23 1455   BP: 132/82 120/80   BP Location: Left arm Left arm   Patient Position: Sitting Sitting   Cuff Size: Adult Adult   Pulse: 61    Resp: 14    Temp: 99.1 °F (37.3 °C)    TempSrc: Skin    SpO2: 96%    Weight: 71.7 kg (158 lb)    Height: 162.6 cm (64\")       Body mass index is 27.12 kg/m².      Physical Exam  Vitals and nursing note reviewed.   Constitutional:       Appearance: Normal appearance.   HENT:      Head: Normocephalic and atraumatic.      Nose: Nose normal.   Eyes:      Extraocular Movements: Extraocular movements intact.      Pupils: Pupils are equal, round, and reactive to light.   Cardiovascular:      Rate and Rhythm: Normal rate and regular rhythm.   Pulmonary:      Effort: Pulmonary effort is normal.      Breath sounds: Normal breath sounds.   Abdominal:      General: Abdomen is flat.      Palpations: Abdomen is soft.   Musculoskeletal:      Cervical back: Normal range of motion and neck supple.      Comments: Tender to palpation on the heel area dry skin   Skin:     General: Skin is warm and dry.   Neurological:      General: No focal deficit present.      Mental Status: He is alert and oriented to person, place, and time.   Psychiatric:         Mood and Affect: Mood normal.         Behavior: Behavior normal.         RESULTS REVIEW  No results found for: \"PROBNP\", \"BNP\"  CMP          7/28/2023    06:38   CMP   Glucose 126    BUN 18    Creatinine 0.92    EGFR 97.6    Sodium 139    Potassium 4.1    Chloride 104    Calcium 9.5    Total Protein 7.1    Albumin 4.4    Globulin 2.7    Total Bilirubin 0.9    Alkaline Phosphatase " "46    AST (SGOT) 22    ALT (SGPT) 20    Albumin/Globulin Ratio 1.6    BUN/Creatinine Ratio 19.6    Anion Gap 9.0      CBC w/diff          7/28/2023    06:38   CBC w/Diff   WBC 6.53    RBC 4.77    Hemoglobin 14.6    Hematocrit 43.1    MCV 90.4    MCH 30.6    MCHC 33.9    RDW 13.2    Platelets 163    Neutrophil Rel % 35.1    Immature Granulocyte Rel % 0.2    Lymphocyte Rel % 51.9    Monocyte Rel % 8.6    Eosinophil Rel % 3.4    Basophil Rel % 0.8       Lipid Panel          7/28/2023    06:38   Lipid Panel   Total Cholesterol 185    Triglycerides 138    HDL Cholesterol 46    VLDL Cholesterol 25    LDL Cholesterol  114    LDL/HDL Ratio 2.42       No results found for: \"TSH\"   No results found for: \"FREET4\"   A1C Last 3 Results          7/28/2023    06:38   HGBA1C Last 3 Results   Hemoglobin A1C 6.60       PSA          7/28/2023    06:38   PSA   PSA 0.606       No Images in the past 120 days found..              ASSESSMENT & PLAN  Diagnoses and all orders for this visit:    1. Type 2 diabetes mellitus with hyperglycemia, without long-term current use of insulin (Primary)  Assessment & Plan:  Diabetes is not improved A1c equals 6.5 June 2023 states blood sugars are ranging from 1 20-1 28 no low blood sugar.  Not on any medications states following a lower carb diet and not eating as much rice    Plan check A1c today goal is less than 6 with readings above 6 the last several years we will start metformin  mg 1 daily monitor for any diarrhea.  Start aspirin 81 mg 1 daily    Orders:  -     Comprehensive Metabolic Panel; Future  -     Lipid Panel; Future  -     CBC & Differential; Future  -     Vitamin B12; Future  -     Folate; Future  -     Vitamin D,25-Hydroxy; Future  -     Magnesium; Future  -     Microalbumin / Creatinine Urine Ratio - Urine, Clean Catch; Future  -     Hemoglobin A1c; Future  -     TSH+Free T4; Future  -     T3, Free; Future  -     Discontinue: atorvastatin (LIPITOR) 10 MG tablet; Take 1 tablet by " mouth Daily.  Dispense: 30 tablet; Refill: 1  -     atorvastatin (LIPITOR) 10 MG tablet; Take 1 tablet by mouth Daily.  Dispense: 30 tablet; Refill: 1  -     metFORMIN ER (GLUCOPHAGE-XR) 500 MG 24 hr tablet; 2 tabs po daily  Dispense: 60 tablet; Refill: 0  -     T3, Free  -     TSH+Free T4  -     Hemoglobin A1c  -     Microalbumin / Creatinine Urine Ratio - Urine, Clean Catch  -     Magnesium  -     Vitamin D,25-Hydroxy  -     Folate  -     Vitamin B12  -     CBC & Differential  -     Lipid Panel  -     Comprehensive Metabolic Panel    2. Pure hypercholesterolemia  Assessment & Plan:  Lipid abnormalities with LDL not at goal of less than 70 and diabetic.    Plan start atorvastatin 10 mg 1 daily monitor for any myalgias check lipids today goal LDL is less than 70.    Orders:  -     fenofibrate (TRICOR) 48 MG tablet; Take 1 tablet by mouth Daily. FOR 30 DAYS  Dispense: 90 tablet; Refill: 3  -     Comprehensive Metabolic Panel; Future  -     Lipid Panel; Future  -     CBC & Differential; Future  -     Vitamin B12; Future  -     Folate; Future  -     Vitamin D,25-Hydroxy; Future  -     Magnesium; Future  -     Microalbumin / Creatinine Urine Ratio - Urine, Clean Catch; Future  -     Hemoglobin A1c; Future  -     TSH+Free T4; Future  -     T3, Free; Future  -     Discontinue: atorvastatin (LIPITOR) 10 MG tablet; Take 1 tablet by mouth Daily.  Dispense: 30 tablet; Refill: 1  -     atorvastatin (LIPITOR) 10 MG tablet; Take 1 tablet by mouth Daily.  Dispense: 30 tablet; Refill: 1  -     T3, Free  -     TSH+Free T4  -     Hemoglobin A1c  -     Microalbumin / Creatinine Urine Ratio - Urine, Clean Catch  -     Magnesium  -     Vitamin D,25-Hydroxy  -     Folate  -     Vitamin B12  -     CBC & Differential  -     Lipid Panel  -     Comprehensive Metabolic Panel    3. Seasonal allergic rhinitis due to pollen  -     Cetirizine HCl 10 MG/ML solution; Take 10 mg by mouth Daily.  Dispense: 30 mL; Refill: 3  -     Comprehensive  Metabolic Panel; Future  -     Lipid Panel; Future  -     CBC & Differential; Future  -     Vitamin B12; Future  -     Folate; Future  -     Vitamin D,25-Hydroxy; Future  -     Magnesium; Future  -     Microalbumin / Creatinine Urine Ratio - Urine, Clean Catch; Future  -     Hemoglobin A1c; Future  -     TSH+Free T4; Future  -     T3, Free; Future  -     T3, Free  -     TSH+Free T4  -     Hemoglobin A1c  -     Microalbumin / Creatinine Urine Ratio - Urine, Clean Catch  -     Magnesium  -     Vitamin D,25-Hydroxy  -     Folate  -     Vitamin B12  -     CBC & Differential  -     Lipid Panel  -     Comprehensive Metabolic Panel    4. Chronic pain of both feet  Comments:  Possible plantar fasciitis versus heel spurs versus other etiology.  Exercises given and printed for patient.  Refer to podiatrist but get x-rays prior.  Orders:  -     XR Foot 3+ View Right; Future  -     XR Foot 3+ View Left; Future  -     Ambulatory Referral to Podiatry  -     Comprehensive Metabolic Panel; Future  -     Lipid Panel; Future  -     CBC & Differential; Future  -     Vitamin B12; Future  -     Folate; Future  -     Vitamin D,25-Hydroxy; Future  -     Magnesium; Future  -     Microalbumin / Creatinine Urine Ratio - Urine, Clean Catch; Future  -     Hemoglobin A1c; Future  -     TSH+Free T4; Future  -     T3, Free; Future  -     T3, Free  -     TSH+Free T4  -     Hemoglobin A1c  -     Microalbumin / Creatinine Urine Ratio - Urine, Clean Catch  -     Magnesium  -     Vitamin D,25-Hydroxy  -     Folate  -     Vitamin B12  -     CBC & Differential  -     Lipid Panel  -     Comprehensive Metabolic Panel    5. Primary hypertension  Assessment & Plan:  Hypertension is stable.  Goal is less than 130/80.  Currently not on any medications.    Plan-monitor blood pressure goal is less than 130/80 specially in diabetic.  If blood pressure starts to rise will add ACE or ARB      6. Vitamin D deficiency  -     Comprehensive Metabolic Panel; Future  -      Lipid Panel; Future  -     CBC & Differential; Future  -     Vitamin B12; Future  -     Folate; Future  -     Vitamin D,25-Hydroxy; Future  -     Magnesium; Future  -     Microalbumin / Creatinine Urine Ratio - Urine, Clean Catch; Future  -     Hemoglobin A1c; Future  -     TSH+Free T4; Future  -     T3, Free; Future  -     T3, Free  -     TSH+Free T4  -     Hemoglobin A1c  -     Microalbumin / Creatinine Urine Ratio - Urine, Clean Catch  -     Magnesium  -     Vitamin D,25-Hydroxy  -     Folate  -     Vitamin B12  -     CBC & Differential  -     Lipid Panel  -     Comprehensive Metabolic Panel    7. Need for hepatitis C screening test  -     Hepatitis C Antibody; Future  -     Hepatitis C Antibody    8. Need for vaccination  -     Pneumococcal Conjugate Vaccine 20-Valent (PCV20)       Due for Tdap, Shingrix vaccines at pharmacy, flu and COVID  Due for hepatitis C antibody screening  Repeat A1c    BMI is within normal parameters. No other follow-up for BMI required.       Colonoscopy by Dr. Hoskins done February 2022-internal hemorrhoids and mild diverticulosis right colon repeat in 10 years  PSA normal in July 2023    Patient Instructions   Start atorvastatin 10 mg -goal LDL <70  Can stop fenofibrate/tricor  Start metformin  mg one daily-goal A1c<6  Do labs today  Refer to podiatrist  Do foot exercises for plantar fascitis  Start ASA 81 mg daily  Give prevnar -20  At pharmacy get shingles vaccine, Tdap and covid     Give prevnar -20  At pharmacy get shingles vaccine, Tdap and covid    FOLLOW UP  Return in about 3 weeks (around 12/21/2023) for Annual physical, Recheck.    Patient was given instructions and counseling regarding his condition or for health maintenance advice. Please see specific information pulled into the AVS if appropriate.

## 2023-11-30 NOTE — PATIENT INSTRUCTIONS
Start atorvastatin 10 mg -goal LDL <70  Can stop fenofibrate/tricor  Start metformin  mg one daily-goal A1c<6  Do labs today  Refer to podiatrist  Do foot exercises for plantar fascitis  Start ASA 81 mg daily  Give prevnar -20  At pharmacy get shingles vaccine, Tdap and covid

## 2023-11-30 NOTE — ASSESSMENT & PLAN NOTE
Lipid abnormalities are .  Good with triglycerides less than 130.  Feel it is more important for his LDL to be less than 70 and so can stop the fenofibrate patient does not like taking meds    Plan start atorvastatin 10 mg 1 daily can stop fenofibrate at this time since we will be starting metformin for his diabetes.   triglycerides can be elevated with uncontrolled blood sugars

## 2023-12-01 ENCOUNTER — TELEPHONE (OUTPATIENT)
Dept: INTERNAL MEDICINE | Facility: CLINIC | Age: 58
End: 2023-12-01

## 2023-12-01 LAB — FOLATE SERPL-MCNC: 13.1 NG/ML (ref 4.78–24.2)

## 2023-12-01 RX ORDER — CETIRIZINE HYDROCHLORIDE 10 MG/1
10 TABLET ORAL DAILY
Qty: 30 TABLET | Refills: 5 | Status: SHIPPED | OUTPATIENT
Start: 2023-12-01

## 2023-12-01 NOTE — TELEPHONE ENCOUNTER
Pharmacy Name:  [facility]     Reference Number (if applicable): [id number]    Pharmacy representative name: HAIR    Pharmacy representative phone number: [phone]     What medication are you calling in regards to: CETIRIZINE 10 MG PER ML    What question does the pharmacy have: HAIR  STATED THIS WAS WRITTEN FOR INTRAVENOUSLY AND NEEDING TO CHANGE TO ORAL     Who is the provider that prescribed the medication: DR ROSA HASSAN

## 2023-12-11 ENCOUNTER — PATIENT ROUNDING (BHMG ONLY) (OUTPATIENT)
Dept: INTERNAL MEDICINE | Facility: CLINIC | Age: 58
End: 2023-12-11
Payer: COMMERCIAL

## 2023-12-20 ENCOUNTER — OFFICE VISIT (OUTPATIENT)
Dept: INTERNAL MEDICINE | Facility: CLINIC | Age: 58
End: 2023-12-20
Payer: COMMERCIAL

## 2023-12-20 VITALS
HEIGHT: 64 IN | RESPIRATION RATE: 16 BRPM | SYSTOLIC BLOOD PRESSURE: 128 MMHG | WEIGHT: 157 LBS | DIASTOLIC BLOOD PRESSURE: 75 MMHG | HEART RATE: 60 BPM | TEMPERATURE: 97.8 F | BODY MASS INDEX: 26.8 KG/M2 | OXYGEN SATURATION: 98 %

## 2023-12-20 DIAGNOSIS — E11.65 TYPE 2 DIABETES MELLITUS WITH HYPERGLYCEMIA, WITHOUT LONG-TERM CURRENT USE OF INSULIN: ICD-10-CM

## 2023-12-20 DIAGNOSIS — E55.9 VITAMIN D DEFICIENCY: ICD-10-CM

## 2023-12-20 DIAGNOSIS — M79.671 CHRONIC PAIN OF BOTH FEET: ICD-10-CM

## 2023-12-20 DIAGNOSIS — Z00.00 ENCOUNTER FOR ROUTINE HISTORY AND PHYSICAL EXAMINATION: Primary | ICD-10-CM

## 2023-12-20 DIAGNOSIS — M79.672 CHRONIC PAIN OF BOTH FEET: ICD-10-CM

## 2023-12-20 DIAGNOSIS — I10 PRIMARY HYPERTENSION: ICD-10-CM

## 2023-12-20 DIAGNOSIS — G89.29 CHRONIC PAIN OF BOTH FEET: ICD-10-CM

## 2023-12-20 DIAGNOSIS — E78.00 PURE HYPERCHOLESTEROLEMIA: ICD-10-CM

## 2023-12-20 PROBLEM — R73.9 HYPERGLYCEMIA: Status: RESOLVED | Noted: 2021-07-08 | Resolved: 2023-12-20

## 2023-12-20 RX ORDER — ATORVASTATIN CALCIUM 10 MG/1
10 TABLET, FILM COATED ORAL DAILY
Qty: 90 TABLET | Refills: 3 | Status: SHIPPED | OUTPATIENT
Start: 2023-12-20

## 2023-12-20 RX ORDER — METFORMIN HYDROCHLORIDE 500 MG/1
TABLET, EXTENDED RELEASE ORAL
Qty: 180 TABLET | Refills: 3 | Status: SHIPPED | OUTPATIENT
Start: 2023-12-20

## 2023-12-20 NOTE — ASSESSMENT & PLAN NOTE
Ages 40 to 64 Counseling/Anticipatory Guidance Discussed: nutrition, physical activity, healthy weight, injury prevention, misuse of tobacco, alcohol and drugs, dental health, mental health, screenings, and wears seat belts    Patient Instructions   Cont metformin xr 500 mg 2 daily -no diarrhea-if BS<80 can go to one daily  Get hepatitis B, Tdap covid vaccines at pharmacy  Do labs in 6 months-goal LDL <70 then f/u   Follow low carb diet

## 2023-12-20 NOTE — PROGRESS NOTES
CHIEF COMPLAINT  Kristen Metzger presents to [facility] for follow-up of Diabetes.    HPI  Here with daughter  59 yo pt here for f/u and physical/review of labs    Records reviewed, meds reviewed in detail, labs reviewed with patient.  Plan of care is as follows below.    Chol-LDL =123, HDL=45, obpp=977    DM-A1c=6.9 -now on metformin-NM=264 occ 85     feet pain/fasciitis-appt with Dr Hoang     Patient Instructions 11/30/23   Start atorvastatin 10 mg -goal LDL <70  Can stop fenofibrate/tricor  Start metformin  mg one daily-goal A1c<6  Do labs today  Refer to podiatrist  Do foot exercises for plantar fascitis  Start ASA 81 mg daily  Give prevnar -20  At pharmacy get shingles vaccine, Tdap and covid  Due for hepatitis C antibody screening  Repeat A1c     Colonoscopy by Dr. Hoskins done February 2022-internal hemorrhoids and mild diverticulosis right colon repeat in 10 years  PSA normal in July 2023 11/30/23 visit  here to establish care.  PCP was Dr. VIVIAN Meyer     Records reviewed, meds reviewed in detail, labs reviewed with patient.  Plan of care is as follows below.     Main concern is pain in his feet from standing all day at work-works at [employer] -stands all day for 24 yrs     DM-[id number]- A1c July 28, 2023 equals 6.6 consistent with diabetes-no meds-start ASA 81 mg     Cholesterol-LDL equals 114 cholesterol 185 and HDL 46-LDL goal is less than 70 since with diabetes     Past Medical history significant for hyperlipidemia-on fenofibrate, allergic rhinitis, diabetes mellitus     SH-works at [employer] x 20+ yrs-drinks socially-smoked cigs for 34 yrs < 1/2 PPD-quit 6 yrs ago     Past History:  Allergies: Patient has no known allergies.   Medical History: has a past medical history of Hyperlipidemia and Prediabetes (07/01/2023).   Surgical History: has a past surgical history that includes Colonoscopy (N/A, 2/11/2022).   Family History: family history includes Diabetes in  "his brother and mother; Hypertension in his mother.   Social History: reports that he quit smoking about 6 years ago. His smoking use included cigarettes. He started smoking about 53 years ago. He smoked an average of .25 packs per day. He has never used smokeless tobacco. He reports current alcohol use. He reports that he does not use drugs.  Social History         Current Outpatient Medications:     atorvastatin (LIPITOR) 10 MG tablet, Take 1 tablet by mouth Daily., Disp: 90 tablet, Rfl: 3    cetirizine (zyrTEC) 10 MG tablet, Take 1 tablet by mouth Daily., Disp: 30 tablet, Rfl: 5    metFORMIN ER (GLUCOPHAGE-XR) 500 MG 24 hr tablet, 2 tabs po daily, Disp: 180 tablet, Rfl: 3    multivitamin with minerals (CENTRUM ADULTS PO), Take 1 tablet by mouth Daily., Disp: , Rfl:    PFSH reviewed.      OBJECTIVE  Vital Signs  Vitals:    12/20/23 1553   BP: 128/75   BP Location: Left arm   Patient Position: Sitting   Cuff Size: Large Adult   Pulse: 60   Resp: 16   Temp: 97.8 °F (36.6 °C)   TempSrc: Temporal   SpO2: 98%   Weight: 71.2 kg (157 lb)   Height: 162.6 cm (64\")      Body mass index is 26.95 kg/m².    Physical Exam  Vitals and nursing note reviewed.   Constitutional:       Appearance: Normal appearance.   HENT:      Head: Normocephalic and atraumatic.      Nose: Nose normal.   Eyes:      Extraocular Movements: Extraocular movements intact.      Pupils: Pupils are equal, round, and reactive to light.   Cardiovascular:      Rate and Rhythm: Normal rate and regular rhythm.   Pulmonary:      Effort: Pulmonary effort is normal.      Breath sounds: Normal breath sounds.   Abdominal:      General: Abdomen is flat.      Palpations: Abdomen is soft.   Musculoskeletal:      Cervical back: Normal range of motion and neck supple.   Skin:     General: Skin is warm and dry.   Neurological:      General: No focal deficit present.      Mental Status: He is alert and oriented to person, place, and time.      Comments: Foot exam done " "intact sensation to light touch mild hammertoes good +1 dorsalis pedis and posterior tibialis pulses.   Psychiatric:         Mood and Affect: Mood normal.         Behavior: Behavior normal.          RESULTS REVIEW  No results found for: \"PROBNP\", \"BNP\"  CMP          7/28/2023    06:38 11/30/2023    15:31   CMP   Glucose 126  110    BUN 18  20    Creatinine 0.92  1.11    EGFR 97.6  77.5    Sodium 139  141    Potassium 4.1  3.7    Chloride 104  103    Calcium 9.5  9.7    Total Protein 7.1  7.7    Albumin 4.4  5.1    Globulin 2.7  2.6    Total Bilirubin 0.9  0.6    Alkaline Phosphatase 46  59    AST (SGOT) 22  29    ALT (SGPT) 20  34    Albumin/Globulin Ratio 1.6  2.0    BUN/Creatinine Ratio 19.6  18.0    Anion Gap 9.0  12.0      CBC w/diff          7/28/2023    06:38 11/30/2023    15:31   CBC w/Diff   WBC 6.53  7.82    RBC 4.77  5.03    Hemoglobin 14.6  14.6    Hematocrit 43.1  44.4    MCV 90.4  88.3    MCH 30.6  29.0    MCHC 33.9  32.9    RDW 13.2  12.9    Platelets 163  168    Neutrophil Rel % 35.1  50.8    Immature Granulocyte Rel % 0.2  0.1    Lymphocyte Rel % 51.9  37.2    Monocyte Rel % 8.6  7.8    Eosinophil Rel % 3.4  3.6    Basophil Rel % 0.8  0.5       Lipid Panel          7/28/2023    06:38 11/30/2023    15:31   Lipid Panel   Total Cholesterol 185  228    Triglycerides 138  340    HDL Cholesterol 46  45    VLDL Cholesterol 25  60    LDL Cholesterol  114  123    LDL/HDL Ratio 2.42  2.56       Lab Results   Component Value Date    TSH 0.615 11/30/2023      Lab Results   Component Value Date    FREET4 1.04 11/30/2023      A1C Last 3 Results          7/28/2023    06:38 11/30/2023    15:31   HGBA1C Last 3 Results   Hemoglobin A1C 6.60  6.90       Lab Results   Component Value Date    BVKCDQWD61 802 11/30/2023    DVFK07SQ 35.3 11/30/2023    MG 2.3 11/30/2023     PSA          7/28/2023    06:38   PSA   PSA 0.606       No Images in the past 120 days found..             ASSESSMENT & PLAN  Diagnoses and all orders " for this visit:    1. Encounter for routine history and physical examination (Primary)  Assessment & Plan:  Ages 40 to 64 Counseling/Anticipatory Guidance Discussed: nutrition, physical activity, healthy weight, injury prevention, misuse of tobacco, alcohol and drugs, dental health, mental health, screenings, and wears seat belts    Patient Instructions   Cont metformin xr 500 mg 2 daily -no diarrhea-if BS<80 can go to one daily  Get hepatitis B, Tdap covid vaccines at pharmacy  Do labs in 6 months-goal LDL <70 then f/u   Follow low carb diet    Orders:  -     Comprehensive Metabolic Panel; Future  -     Lipid Panel; Future  -     CBC & Differential; Future  -     Vitamin B12; Future  -     Folate; Future  -     Vitamin D,25-Hydroxy; Future  -     Magnesium; Future  -     Microalbumin / Creatinine Urine Ratio - Urine, Clean Catch; Future  -     Hemoglobin A1c; Future  -     TSH+Free T4; Future  -     T3, Free; Future  -     atorvastatin (LIPITOR) 10 MG tablet; Take 1 tablet by mouth Daily.  Dispense: 90 tablet; Refill: 3  -     metFORMIN ER (GLUCOPHAGE-XR) 500 MG 24 hr tablet; 2 tabs po daily  Dispense: 180 tablet; Refill: 3    2. Type 2 diabetes mellitus with hyperglycemia, without long-term current use of insulin  Assessment & Plan:  Diabetes -fair control A1c equals 6.9 Nov 2023 now on metformin-IP=586 occ 85-no diarrhea-    Plan-cont with metformin  mg 2 daily-no diarrhea  Continue with aspirin 81 mg 1 daily and atorvastatin 10 mg 1 daily no myalgias  Patient wants to wait 6 months due to cost before checking any labs will check A1c and lipids and comp in 6 months  Patient needs to continue to check his blood sugars and if less than 80 can decrease metformin  mg to 1 daily.  Goal A1c is less than 6      Orders:  -     Comprehensive Metabolic Panel; Future  -     Lipid Panel; Future  -     CBC & Differential; Future  -     Vitamin B12; Future  -     Folate; Future  -     Vitamin D,25-Hydroxy;  Future  -     Magnesium; Future  -     Microalbumin / Creatinine Urine Ratio - Urine, Clean Catch; Future  -     Hemoglobin A1c; Future  -     TSH+Free T4; Future  -     T3, Free; Future  -     atorvastatin (LIPITOR) 10 MG tablet; Take 1 tablet by mouth Daily.  Dispense: 90 tablet; Refill: 3  -     metFORMIN ER (GLUCOPHAGE-XR) 500 MG 24 hr tablet; 2 tabs po daily  Dispense: 180 tablet; Refill: 3    3. Pure hypercholesterolemia  Assessment & Plan:  Lipid abnormalities with LDL not at goal of less than 70 and diabetic.  Tolerating atorvastatin 10 mg 1 daily without any myalgias    Plan -continue with atorvastatin 10 mg 1 daily monitor for any myalgias- check lipids in 6 months-goal LDL is less than 70.  Declined getting A1c or labs sooner than 6 months due to cost.    Orders:  -     Comprehensive Metabolic Panel; Future  -     Lipid Panel; Future  -     CBC & Differential; Future  -     Vitamin B12; Future  -     Folate; Future  -     Vitamin D,25-Hydroxy; Future  -     Magnesium; Future  -     Microalbumin / Creatinine Urine Ratio - Urine, Clean Catch; Future  -     Hemoglobin A1c; Future  -     TSH+Free T4; Future  -     T3, Free; Future  -     atorvastatin (LIPITOR) 10 MG tablet; Take 1 tablet by mouth Daily.  Dispense: 90 tablet; Refill: 3  -     metFORMIN ER (GLUCOPHAGE-XR) 500 MG 24 hr tablet; 2 tabs po daily  Dispense: 180 tablet; Refill: 3    4. Chronic pain of both feet  -     Comprehensive Metabolic Panel; Future  -     Lipid Panel; Future  -     CBC & Differential; Future  -     Vitamin B12; Future  -     Folate; Future  -     Vitamin D,25-Hydroxy; Future  -     Magnesium; Future  -     Microalbumin / Creatinine Urine Ratio - Urine, Clean Catch; Future  -     Hemoglobin A1c; Future  -     TSH+Free T4; Future  -     T3, Free; Future  -     atorvastatin (LIPITOR) 10 MG tablet; Take 1 tablet by mouth Daily.  Dispense: 90 tablet; Refill: 3  -     metFORMIN ER (GLUCOPHAGE-XR) 500 MG 24 hr tablet; 2 tabs po daily   Dispense: 180 tablet; Refill: 3    5. Primary hypertension  Assessment & Plan:  Hypertension is stable.  Goal is less than 120/75 currently not on any medications.    Plan-monitor blood pressure goal is less than 130/80 specially in diabetic.  If blood pressure starts to rise will add ACE or ARB    Orders:  -     Comprehensive Metabolic Panel; Future  -     Lipid Panel; Future  -     CBC & Differential; Future  -     Vitamin B12; Future  -     Folate; Future  -     Vitamin D,25-Hydroxy; Future  -     Magnesium; Future  -     Microalbumin / Creatinine Urine Ratio - Urine, Clean Catch; Future  -     Hemoglobin A1c; Future  -     TSH+Free T4; Future  -     T3, Free; Future  -     atorvastatin (LIPITOR) 10 MG tablet; Take 1 tablet by mouth Daily.  Dispense: 90 tablet; Refill: 3  -     metFORMIN ER (GLUCOPHAGE-XR) 500 MG 24 hr tablet; 2 tabs po daily  Dispense: 180 tablet; Refill: 3    6. Vitamin D deficiency  -     Comprehensive Metabolic Panel; Future  -     Lipid Panel; Future  -     CBC & Differential; Future  -     Vitamin B12; Future  -     Folate; Future  -     Vitamin D,25-Hydroxy; Future  -     Magnesium; Future  -     Microalbumin / Creatinine Urine Ratio - Urine, Clean Catch; Future  -     Hemoglobin A1c; Future  -     TSH+Free T4; Future  -     T3, Free; Future  -     atorvastatin (LIPITOR) 10 MG tablet; Take 1 tablet by mouth Daily.  Dispense: 90 tablet; Refill: 3  -     metFORMIN ER (GLUCOPHAGE-XR) 500 MG 24 hr tablet; 2 tabs po daily  Dispense: 180 tablet; Refill: 3         BMI is >= 25 and <30. (Overweight) The following options were offered after discussion;: weight loss educational material (shared in after visit summary), exercise counseling/recommendations, and nutrition counseling/recommendations       Patient Instructions   Cont metformin xr 500 mg 2 daily -no diarrhea-if BS<80 can go to one daily  Get hepatitis B, Tdap covid vaccines at pharmacy  Do labs in 6 months-goal LDL <70 then f/u   Follow  low carb diet     FOLLOW UP  Return in about 6 months (around 6/20/2024) for Recheck.  Labs prior    Patient was given instructions and counseling regarding his condition or for health maintenance advice. Please see specific information pulled into the AVS if appropriate.

## 2023-12-20 NOTE — ASSESSMENT & PLAN NOTE
Lipid abnormalities with LDL not at goal of less than 70 and diabetic.  Tolerating atorvastatin 10 mg 1 daily without any myalgias    Plan -continue with atorvastatin 10 mg 1 daily monitor for any myalgias- check lipids in 6 months-goal LDL is less than 70.  Declined getting A1c or labs sooner than 6 months due to cost.

## 2023-12-20 NOTE — ASSESSMENT & PLAN NOTE
Hypertension is stable.  Goal is less than 120/75 currently not on any medications.    Plan-monitor blood pressure goal is less than 130/80 specially in diabetic.  If blood pressure starts to rise will add ACE or ARB

## 2023-12-20 NOTE — PATIENT INSTRUCTIONS
Cont metformin xr 500 mg 2 daily -no diarrhea-if BS<80 can go to one daily  Get hepatitis B, Tdap covid vaccines at pharmacy  Do labs in 6 months-goal LDL <70 then f/u   Follow low carb diet

## 2023-12-20 NOTE — ASSESSMENT & PLAN NOTE
Diabetes -fair control A1c equals 6.9 Nov 2023 now on metformin-FM=621 occ 85-no diarrhea-    Plan-cont with metformin  mg 2 daily-no diarrhea  Continue with aspirin 81 mg 1 daily and atorvastatin 10 mg 1 daily no myalgias  Patient wants to wait 6 months due to cost before checking any labs will check A1c and lipids and comp in 6 months  Patient needs to continue to check his blood sugars and if less than 80 can decrease metformin  mg to 1 daily.  Goal A1c is less than 6

## 2024-06-05 ENCOUNTER — LAB (OUTPATIENT)
Dept: LAB | Facility: HOSPITAL | Age: 59
End: 2024-06-05
Payer: COMMERCIAL

## 2024-06-05 DIAGNOSIS — E78.00 PURE HYPERCHOLESTEROLEMIA: ICD-10-CM

## 2024-06-05 DIAGNOSIS — M79.671 CHRONIC PAIN OF BOTH FEET: ICD-10-CM

## 2024-06-05 DIAGNOSIS — E55.9 VITAMIN D DEFICIENCY: ICD-10-CM

## 2024-06-05 DIAGNOSIS — M79.672 CHRONIC PAIN OF BOTH FEET: ICD-10-CM

## 2024-06-05 DIAGNOSIS — Z00.00 ENCOUNTER FOR ROUTINE HISTORY AND PHYSICAL EXAMINATION: ICD-10-CM

## 2024-06-05 DIAGNOSIS — G89.29 CHRONIC PAIN OF BOTH FEET: ICD-10-CM

## 2024-06-05 DIAGNOSIS — E11.65 TYPE 2 DIABETES MELLITUS WITH HYPERGLYCEMIA, WITHOUT LONG-TERM CURRENT USE OF INSULIN: ICD-10-CM

## 2024-06-05 DIAGNOSIS — I10 PRIMARY HYPERTENSION: ICD-10-CM

## 2024-06-05 LAB
25(OH)D3 SERPL-MCNC: 34.3 NG/ML (ref 30–100)
ALBUMIN SERPL-MCNC: 4.6 G/DL (ref 3.5–5.2)
ALBUMIN UR-MCNC: <1.2 MG/DL
ALBUMIN/GLOB SERPL: 1.6 G/DL
ALP SERPL-CCNC: 59 U/L (ref 39–117)
ALT SERPL W P-5'-P-CCNC: 43 U/L (ref 1–41)
ANION GAP SERPL CALCULATED.3IONS-SCNC: 8 MMOL/L (ref 5–15)
AST SERPL-CCNC: 27 U/L (ref 1–40)
BASOPHILS # BLD AUTO: 0.04 10*3/MM3 (ref 0–0.2)
BASOPHILS NFR BLD AUTO: 0.6 % (ref 0–1.5)
BILIRUB SERPL-MCNC: 0.8 MG/DL (ref 0–1.2)
BUN SERPL-MCNC: 18 MG/DL (ref 6–20)
BUN/CREAT SERPL: 21.4 (ref 7–25)
CALCIUM SPEC-SCNC: 9.3 MG/DL (ref 8.6–10.5)
CHLORIDE SERPL-SCNC: 104 MMOL/L (ref 98–107)
CHOLEST SERPL-MCNC: 159 MG/DL (ref 0–200)
CO2 SERPL-SCNC: 29 MMOL/L (ref 22–29)
CREAT SERPL-MCNC: 0.84 MG/DL (ref 0.76–1.27)
CREAT UR-MCNC: 140.4 MG/DL
DEPRECATED RDW RBC AUTO: 44.3 FL (ref 37–54)
EGFRCR SERPLBLD CKD-EPI 2021: 101.7 ML/MIN/1.73
EOSINOPHIL # BLD AUTO: 0.18 10*3/MM3 (ref 0–0.4)
EOSINOPHIL NFR BLD AUTO: 2.8 % (ref 0.3–6.2)
ERYTHROCYTE [DISTWIDTH] IN BLOOD BY AUTOMATED COUNT: 13.1 % (ref 12.3–15.4)
FOLATE SERPL-MCNC: 14.4 NG/ML (ref 4.78–24.2)
GLOBULIN UR ELPH-MCNC: 2.9 GM/DL
GLUCOSE SERPL-MCNC: 141 MG/DL (ref 65–99)
HBA1C MFR BLD: 6.4 % (ref 4.8–5.6)
HCT VFR BLD AUTO: 45.9 % (ref 37.5–51)
HDLC SERPL-MCNC: 46 MG/DL (ref 40–60)
HGB BLD-MCNC: 14.9 G/DL (ref 13–17.7)
IMM GRANULOCYTES # BLD AUTO: 0.01 10*3/MM3 (ref 0–0.05)
IMM GRANULOCYTES NFR BLD AUTO: 0.2 % (ref 0–0.5)
LDLC SERPL CALC-MCNC: 78 MG/DL (ref 0–100)
LDLC/HDLC SERPL: 1.55 {RATIO}
LYMPHOCYTES # BLD AUTO: 2.8 10*3/MM3 (ref 0.7–3.1)
LYMPHOCYTES NFR BLD AUTO: 44.3 % (ref 19.6–45.3)
MAGNESIUM SERPL-MCNC: 2.9 MG/DL (ref 1.6–2.6)
MCH RBC QN AUTO: 29.9 PG (ref 26.6–33)
MCHC RBC AUTO-ENTMCNC: 32.5 G/DL (ref 31.5–35.7)
MCV RBC AUTO: 92 FL (ref 79–97)
MICROALBUMIN/CREAT UR: NORMAL MG/G{CREAT}
MONOCYTES # BLD AUTO: 0.58 10*3/MM3 (ref 0.1–0.9)
MONOCYTES NFR BLD AUTO: 9.2 % (ref 5–12)
NEUTROPHILS NFR BLD AUTO: 2.71 10*3/MM3 (ref 1.7–7)
NEUTROPHILS NFR BLD AUTO: 42.9 % (ref 42.7–76)
NRBC BLD AUTO-RTO: 0 /100 WBC (ref 0–0.2)
PLATELET # BLD AUTO: 149 10*3/MM3 (ref 140–450)
PMV BLD AUTO: 12.2 FL (ref 6–12)
POTASSIUM SERPL-SCNC: 4 MMOL/L (ref 3.5–5.2)
PROT SERPL-MCNC: 7.5 G/DL (ref 6–8.5)
RBC # BLD AUTO: 4.99 10*6/MM3 (ref 4.14–5.8)
SODIUM SERPL-SCNC: 141 MMOL/L (ref 136–145)
T3FREE SERPL-MCNC: 3.6 PG/ML (ref 2–4.4)
T4 FREE SERPL-MCNC: 1.11 NG/DL (ref 0.92–1.68)
TRIGL SERPL-MCNC: 209 MG/DL (ref 0–150)
TSH SERPL DL<=0.05 MIU/L-ACNC: 0.71 UIU/ML (ref 0.27–4.2)
VIT B12 BLD-MCNC: 751 PG/ML (ref 211–946)
VLDLC SERPL-MCNC: 35 MG/DL (ref 5–40)
WBC NRBC COR # BLD AUTO: 6.32 10*3/MM3 (ref 3.4–10.8)

## 2024-06-05 PROCEDURE — 83036 HEMOGLOBIN GLYCOSYLATED A1C: CPT

## 2024-06-05 PROCEDURE — 82570 ASSAY OF URINE CREATININE: CPT

## 2024-06-05 PROCEDURE — 82306 VITAMIN D 25 HYDROXY: CPT

## 2024-06-05 PROCEDURE — 85025 COMPLETE CBC W/AUTO DIFF WBC: CPT

## 2024-06-05 PROCEDURE — 82043 UR ALBUMIN QUANTITATIVE: CPT

## 2024-06-05 PROCEDURE — 84443 ASSAY THYROID STIM HORMONE: CPT

## 2024-06-05 PROCEDURE — 36415 COLL VENOUS BLD VENIPUNCTURE: CPT

## 2024-06-05 PROCEDURE — 84439 ASSAY OF FREE THYROXINE: CPT

## 2024-06-05 PROCEDURE — 82607 VITAMIN B-12: CPT

## 2024-06-05 PROCEDURE — 80053 COMPREHEN METABOLIC PANEL: CPT

## 2024-06-05 PROCEDURE — 82746 ASSAY OF FOLIC ACID SERUM: CPT

## 2024-06-05 PROCEDURE — 84481 FREE ASSAY (FT-3): CPT

## 2024-06-05 PROCEDURE — 83735 ASSAY OF MAGNESIUM: CPT

## 2024-06-05 PROCEDURE — 80061 LIPID PANEL: CPT

## 2024-06-12 ENCOUNTER — OFFICE VISIT (OUTPATIENT)
Dept: INTERNAL MEDICINE | Age: 59
End: 2024-06-12
Payer: COMMERCIAL

## 2024-06-12 VITALS
WEIGHT: 157.4 LBS | HEART RATE: 67 BPM | OXYGEN SATURATION: 95 % | TEMPERATURE: 98.4 F | HEIGHT: 64 IN | SYSTOLIC BLOOD PRESSURE: 115 MMHG | BODY MASS INDEX: 26.87 KG/M2 | DIASTOLIC BLOOD PRESSURE: 71 MMHG

## 2024-06-12 DIAGNOSIS — Z23 NEED FOR SHINGLES VACCINE: ICD-10-CM

## 2024-06-12 DIAGNOSIS — Z23 NEED FOR TDAP VACCINATION: ICD-10-CM

## 2024-06-12 DIAGNOSIS — E78.00 PURE HYPERCHOLESTEROLEMIA: ICD-10-CM

## 2024-06-12 DIAGNOSIS — E55.9 VITAMIN D DEFICIENCY: ICD-10-CM

## 2024-06-12 DIAGNOSIS — I10 PRIMARY HYPERTENSION: Primary | ICD-10-CM

## 2024-06-12 DIAGNOSIS — Z12.5 SCREENING FOR MALIGNANT NEOPLASM OF PROSTATE: ICD-10-CM

## 2024-06-12 DIAGNOSIS — E11.65 TYPE 2 DIABETES MELLITUS WITH HYPERGLYCEMIA, WITHOUT LONG-TERM CURRENT USE OF INSULIN: ICD-10-CM

## 2024-06-12 PROCEDURE — 99214 OFFICE O/P EST MOD 30 MIN: CPT | Performed by: INTERNAL MEDICINE

## 2024-06-12 PROCEDURE — 90750 HZV VACC RECOMBINANT IM: CPT | Performed by: INTERNAL MEDICINE

## 2024-06-12 PROCEDURE — 90471 IMMUNIZATION ADMIN: CPT | Performed by: INTERNAL MEDICINE

## 2024-06-12 PROCEDURE — 90472 IMMUNIZATION ADMIN EACH ADD: CPT | Performed by: INTERNAL MEDICINE

## 2024-06-12 PROCEDURE — 90715 TDAP VACCINE 7 YRS/> IM: CPT | Performed by: INTERNAL MEDICINE

## 2024-06-12 RX ORDER — METFORMIN HYDROCHLORIDE 500 MG/1
TABLET, EXTENDED RELEASE ORAL
Qty: 90 TABLET | Refills: 1 | Status: SHIPPED | OUTPATIENT
Start: 2024-06-12 | End: 2024-06-12

## 2024-06-12 RX ORDER — METFORMIN HYDROCHLORIDE 500 MG/1
TABLET, EXTENDED RELEASE ORAL
Qty: 90 TABLET | Refills: 1 | Status: SHIPPED | OUTPATIENT
Start: 2024-06-12

## 2024-06-12 RX ORDER — ATORVASTATIN CALCIUM 20 MG/1
20 TABLET, FILM COATED ORAL DAILY
Qty: 90 TABLET | Refills: 1 | Status: SHIPPED | OUTPATIENT
Start: 2024-06-12

## 2024-06-12 NOTE — PROGRESS NOTES
CHIEF COMPLAINT  Kristen Metzger presents to [facility] for follow-up of Diabetes (Pt is a 58 yr old male presenting to Internal Medicine for a 6 month follow up on diabetes; Pt reports to check BS daily with average readings in the 130's ).    HPI    58-year-old patient here for follow-up of diabetes and review of labs    Records reviewed, meds reviewed in detail, labs reviewed with patient.  Plan of care is as follows below.    Diabetes-A1c equals 6.4 much improved from 6.9 in November 2023    Magnesium elevated 2.9    Cholesterol improved with LDL 78 was 123, HDL 46, total cholesterol 159    Older notes  Patient Instructions 12/20/2023   Cont metformin xr 500 mg 2 daily -no diarrhea-if BS<80 can go to one daily  Get hepatitis B, Tdap covid vaccines at pharmacy  Do labs in 6 months-goal LDL <70 then f/u   Follow low carb diet      December 20, 2023 visit    pt here for f/u and physical/review of labs     Records reviewed, meds reviewed in detail, labs reviewed with patient.  Plan of care is as follows below.     Chol-LDL =123, HDL=45, ivdr=770     DM-A1c=6.9 -now on metformin-UQ=885 occ 85      feet pain/fasciitis-appt with Dr Hoang     Patient Instructions 11/30/23   Start atorvastatin 10 mg -goal LDL <70  Can stop fenofibrate/tricor  Start metformin  mg one daily-goal A1c<6  Do labs today  Refer to podiatrist  Do foot exercises for plantar fascitis  Start ASA 81 mg daily  Give prevnar -20  At pharmacy get shingles vaccine, Tdap and covid  Due for hepatitis C antibody screening  Repeat A1c     Colonoscopy by Dr. Hoskins done February 2022-internal hemorrhoids and mild diverticulosis right colon repeat in 10 years  PSA normal in July 2023 11/30/23 visit  here to establish care.  PCP was Dr. VIVIAN Meyer     Records reviewed, meds reviewed in detail, labs reviewed with patient.  Plan of care is as follows below.     Main concern is pain in his feet from standing all day at  "work-works at [employer] -stands all day for 24 yrs     DM-RH=[id number]- A1c July 28, 2023 equals 6.6 consistent with diabetes-no meds-start ASA 81 mg     Cholesterol-LDL equals 114 cholesterol 185 and HDL 46-LDL goal is less than 70 since with diabetes     Past Medical history significant for hyperlipidemia-on fenofibrate, allergic rhinitis, diabetes mellitus     SH-works at [employer] x 20+ yrs-drinks socially-smoked cigs for 34 yrs < 1/2 PPD-quit 6 yrs ago       Current Outpatient Medications:     metFORMIN ER (GLUCOPHAGE-XR) 500 MG 24 hr tablet, 3 tabs po daily, Disp: 90 tablet, Rfl: 1    multivitamin with minerals (CENTRUM ADULTS PO), Take 1 tablet by mouth Daily., Disp: , Rfl:     atorvastatin (LIPITOR) 20 MG tablet, Take 1 tablet by mouth Daily., Disp: 90 tablet, Rfl: 1   PFSH reviewed.      OBJECTIVE  Vital Signs  Vitals:    06/12/24 1315   BP: 115/71   BP Location: Left arm   Patient Position: Sitting   Cuff Size: Adult   Pulse: 67   Temp: 98.4 °F (36.9 °C)   TempSrc: Infrared   SpO2: 95%   Weight: 71.4 kg (157 lb 6.4 oz)   Height: 162.6 cm (64.02\")      Body mass index is 27 kg/m².    Physical Exam  Vitals and nursing note reviewed.   Constitutional:       Appearance: Normal appearance.   HENT:      Head: Normocephalic and atraumatic.      Nose: Nose normal.   Eyes:      Extraocular Movements: Extraocular movements intact.      Pupils: Pupils are equal, round, and reactive to light.   Cardiovascular:      Rate and Rhythm: Normal rate and regular rhythm.   Pulmonary:      Effort: Pulmonary effort is normal.      Breath sounds: Normal breath sounds.   Abdominal:      General: Abdomen is flat.      Palpations: Abdomen is soft.   Musculoskeletal:      Cervical back: Normal range of motion and neck supple.   Skin:     General: Skin is warm and dry.   Neurological:      General: No focal deficit present.      Mental Status: He is alert and oriented to person, place, and time.   Psychiatric:         Mood and Affect: Mood " "normal.         Behavior: Behavior normal.          RESULTS REVIEW  No results found for: \"PROBNP\", \"BNP\"  CMP          7/28/2023    06:38 11/30/2023    15:31 6/5/2024    06:47   CMP   Glucose 126  110  141    BUN 18  20  18    Creatinine 0.92  1.11  0.84    EGFR 97.6  77.5  101.7    Sodium 139  141  141    Potassium 4.1  3.7  4.0    Chloride 104  103  104    Calcium 9.5  9.7  9.3    Total Protein 7.1  7.7  7.5    Albumin 4.4  5.1  4.6    Globulin 2.7  2.6  2.9    Total Bilirubin 0.9  0.6  0.8    Alkaline Phosphatase 46  59  59    AST (SGOT) 22  29  27    ALT (SGPT) 20  34  43    Albumin/Globulin Ratio 1.6  2.0  1.6    BUN/Creatinine Ratio 19.6  18.0  21.4    Anion Gap 9.0  12.0  8.0      CBC w/diff          7/28/2023    06:38 11/30/2023    15:31 6/5/2024    06:47   CBC w/Diff   WBC 6.53  7.82  6.32    RBC 4.77  5.03  4.99    Hemoglobin 14.6  14.6  14.9    Hematocrit 43.1  44.4  45.9    MCV 90.4  88.3  92.0    MCH 30.6  29.0  29.9    MCHC 33.9  32.9  32.5    RDW 13.2  12.9  13.1    Platelets 163  168  149    Neutrophil Rel % 35.1  50.8  42.9    Immature Granulocyte Rel % 0.2  0.1  0.2    Lymphocyte Rel % 51.9  37.2  44.3    Monocyte Rel % 8.6  7.8  9.2    Eosinophil Rel % 3.4  3.6  2.8    Basophil Rel % 0.8  0.5  0.6       Lipid Panel          7/28/2023    06:38 11/30/2023    15:31 6/5/2024    06:47   Lipid Panel   Total Cholesterol 185  228  159    Triglycerides 138  340  209    HDL Cholesterol 46  45  46    VLDL Cholesterol 25  60  35    LDL Cholesterol  114  123  78    LDL/HDL Ratio 2.42  2.56  1.55       Lab Results   Component Value Date    TSH 0.708 06/05/2024    TSH 0.615 11/30/2023      Lab Results   Component Value Date    FREET4 1.11 06/05/2024    FREET4 1.04 11/30/2023      A1C Last 3 Results          7/28/2023    06:38 11/30/2023    15:31 6/5/2024    06:47   HGBA1C Last 3 Results   Hemoglobin A1C 6.60  6.90  6.40       Lab Results   Component Value Date    RXDYCHYR83 751 06/05/2024    BMAO12ZT 34.3 " 06/05/2024    MG 2.9 (H) 06/05/2024     PSA          7/28/2023    06:38   PSA   PSA 0.606       No Images in the past 120 days found..             ASSESSMENT & PLAN  Diagnoses and all orders for this visit:    1. Primary hypertension (Primary)  -     atorvastatin (LIPITOR) 20 MG tablet; Take 1 tablet by mouth Daily.  Dispense: 90 tablet; Refill: 1  -     Discontinue: metFORMIN ER (GLUCOPHAGE-XR) 500 MG 24 hr tablet; 2 tabs po   daily for one week then increase to 4 tablets po daily  Dispense: 90 tablet; Refill: 1  -     Comprehensive Metabolic Panel; Future  -     Lipid Panel; Future  -     CBC & Differential; Future  -     Vitamin B12; Future  -     Folate; Future  -     Vitamin D,25-Hydroxy; Future  -     Magnesium; Future  -     Microalbumin / Creatinine Urine Ratio - Urine, Clean Catch; Future  -     Hemoglobin A1c; Future  -     TSH+Free T4; Future  -     T3, Free; Future  -     PSA SCREENING; Future  -     metFORMIN ER (GLUCOPHAGE-XR) 500 MG 24 hr tablet; 3 tabs po daily  Dispense: 90 tablet; Refill: 1    2. Pure hypercholesterolemia  Assessment & Plan:  Cholesterol improved with LDL 78 was 123, HDL 46, total cholesterol 159-LDL still not at goal of less than 70-no myalgias    Plan-increase Lipitor to 20 mg daily-check lipids at next visit    Orders:  -     atorvastatin (LIPITOR) 20 MG tablet; Take 1 tablet by mouth Daily.  Dispense: 90 tablet; Refill: 1  -     Discontinue: metFORMIN ER (GLUCOPHAGE-XR) 500 MG 24 hr tablet; 2 tabs po   daily for one week then increase to 4 tablets po daily  Dispense: 90 tablet; Refill: 1  -     Comprehensive Metabolic Panel; Future  -     Lipid Panel; Future  -     CBC & Differential; Future  -     Vitamin B12; Future  -     Folate; Future  -     Vitamin D,25-Hydroxy; Future  -     Magnesium; Future  -     Microalbumin / Creatinine Urine Ratio - Urine, Clean Catch; Future  -     Hemoglobin A1c; Future  -     TSH+Free T4; Future  -     T3, Free; Future  -     PSA SCREENING;  Future  -     metFORMIN ER (GLUCOPHAGE-XR) 500 MG 24 hr tablet; 3 tabs po daily  Dispense: 90 tablet; Refill: 1    3. Type 2 diabetes mellitus with hyperglycemia, without long-term current use of insulin  Assessment & Plan:  Diabetes -A1c is improved at 6.4 compared to 6.9 in November 2023    Plan-increase metformin XR to 5 mg 2 tablets daily- currently no diarrhea   continue with aspirin 81 mg 1 daily   Patient needs to continue to check his blood sugars and if less than 80 can decrease metformin  mg to 1 daily.  Goal A1c is less than 6  Follow-up in 6 months with lipids prior and A1c      Orders:  -     atorvastatin (LIPITOR) 20 MG tablet; Take 1 tablet by mouth Daily.  Dispense: 90 tablet; Refill: 1  -     Discontinue: metFORMIN ER (GLUCOPHAGE-XR) 500 MG 24 hr tablet; 2 tabs po   daily for one week then increase to 4 tablets po daily  Dispense: 90 tablet; Refill: 1  -     Comprehensive Metabolic Panel; Future  -     Lipid Panel; Future  -     CBC & Differential; Future  -     Vitamin B12; Future  -     Folate; Future  -     Vitamin D,25-Hydroxy; Future  -     Magnesium; Future  -     Microalbumin / Creatinine Urine Ratio - Urine, Clean Catch; Future  -     Hemoglobin A1c; Future  -     TSH+Free T4; Future  -     T3, Free; Future  -     PSA SCREENING; Future  -     metFORMIN ER (GLUCOPHAGE-XR) 500 MG 24 hr tablet; 3 tabs po daily  Dispense: 90 tablet; Refill: 1    4. Screening for malignant neoplasm of prostate  -     Comprehensive Metabolic Panel; Future  -     Lipid Panel; Future  -     CBC & Differential; Future  -     Vitamin B12; Future  -     Folate; Future  -     Vitamin D,25-Hydroxy; Future  -     Magnesium; Future  -     Microalbumin / Creatinine Urine Ratio - Urine, Clean Catch; Future  -     Hemoglobin A1c; Future  -     TSH+Free T4; Future  -     T3, Free; Future  -     PSA SCREENING; Future    5. Vitamin D deficiency  -     Comprehensive Metabolic Panel; Future  -     Lipid Panel; Future  -      CBC & Differential; Future  -     Vitamin B12; Future  -     Folate; Future  -     Vitamin D,25-Hydroxy; Future  -     Magnesium; Future  -     Microalbumin / Creatinine Urine Ratio - Urine, Clean Catch; Future  -     Hemoglobin A1c; Future  -     TSH+Free T4; Future  -     T3, Free; Future  -     PSA SCREENING; Future    6. Need for Tdap vaccination  -     Tdap Vaccine => 8yo IM (BOOSTRIX)    7. Need for shingles vaccine  -     Shingrix Vaccine               Physical due after 12/20/24  Patient Instructions   Increase metformin XR to 500 mg 3 daily  Increase lipitor to 20 mg daily  Continue with ASA 81 mg daily       FOLLOW UP  Return in about 6 months (around 12/12/2024) for Recheck.    Patient was given instructions and counseling regarding his condition or for health maintenance advice. Please see specific information pulled into the AVS if appropriate.

## 2024-06-12 NOTE — PATIENT INSTRUCTIONS
Increase metformin XR to 500 mg 3 daily  Increase lipitor to 20 mg daily  Continue with ASA 81 mg daily

## 2024-06-12 NOTE — ASSESSMENT & PLAN NOTE
Cholesterol improved with LDL 78 was 123, HDL 46, total cholesterol 159-LDL still not at goal of less than 70-no myalgias    Plan-increase Lipitor to 20 mg daily-check lipids at next visit

## 2024-06-12 NOTE — ASSESSMENT & PLAN NOTE
Anesthesia Evaluation     Patient summary reviewed and Nursing notes reviewed                Airway   Mallampati: II  TM distance: >3 FB  Neck ROM: full  Dental      Pulmonary    (+) ,sleep apnea on CPAP  Cardiovascular     ECG reviewed  PT is on anticoagulation therapy  Rhythm: regular  Rate: normal    (+) hypertension, valvular problems/murmurs AS, dysrhythmias Paroxysmal Atrial Fib      Neuro/Psych  (+) syncope, psychiatric history Anxiety and Depression  GI/Hepatic/Renal/Endo    (+) obesity, renal disease-    Musculoskeletal     Abdominal    Substance History - negative use     OB/GYN negative ob/gyn ROS         Other   arthritis,                   Anesthesia Plan    ASA 3     MAC     (PAF s/p conversion now in SR with RBBB and LAFB/PVC's)  intravenous induction     Anesthetic plan, risks, benefits, and alternatives have been provided, discussed and informed consent has been obtained with: patient.    CODE STATUS:          Diabetes -A1c is improved at 6.4 compared to 6.9 in November 2023    Plan-increase metformin XR to 5 mg 2 tablets daily- currently no diarrhea   continue with aspirin 81 mg 1 daily   Patient needs to continue to check his blood sugars and if less than 80 can decrease metformin  mg to 1 daily.  Goal A1c is less than 6  Follow-up in 6 months with lipids prior and A1c

## 2024-12-06 ENCOUNTER — LAB (OUTPATIENT)
Dept: LAB | Facility: HOSPITAL | Age: 59
End: 2024-12-06
Payer: COMMERCIAL

## 2024-12-06 DIAGNOSIS — E55.9 VITAMIN D DEFICIENCY: ICD-10-CM

## 2024-12-06 DIAGNOSIS — I10 PRIMARY HYPERTENSION: ICD-10-CM

## 2024-12-06 DIAGNOSIS — Z12.5 SCREENING FOR MALIGNANT NEOPLASM OF PROSTATE: ICD-10-CM

## 2024-12-06 DIAGNOSIS — E78.00 PURE HYPERCHOLESTEROLEMIA: ICD-10-CM

## 2024-12-06 DIAGNOSIS — E11.65 TYPE 2 DIABETES MELLITUS WITH HYPERGLYCEMIA, WITHOUT LONG-TERM CURRENT USE OF INSULIN: ICD-10-CM

## 2024-12-06 LAB
25(OH)D3 SERPL-MCNC: 25.6 NG/ML (ref 30–100)
ALBUMIN SERPL-MCNC: 4.6 G/DL (ref 3.5–5.2)
ALBUMIN UR-MCNC: 1.8 MG/DL
ALBUMIN/GLOB SERPL: 1.5 G/DL
ALP SERPL-CCNC: 67 U/L (ref 39–117)
ALT SERPL W P-5'-P-CCNC: 33 U/L (ref 1–41)
ANION GAP SERPL CALCULATED.3IONS-SCNC: 13 MMOL/L (ref 5–15)
AST SERPL-CCNC: 27 U/L (ref 1–40)
BASOPHILS # BLD AUTO: 0.04 10*3/MM3 (ref 0–0.2)
BASOPHILS NFR BLD AUTO: 0.6 % (ref 0–1.5)
BILIRUB SERPL-MCNC: 0.9 MG/DL (ref 0–1.2)
BUN SERPL-MCNC: 16 MG/DL (ref 6–20)
BUN/CREAT SERPL: 15.1 (ref 7–25)
CALCIUM SPEC-SCNC: 9.5 MG/DL (ref 8.6–10.5)
CHLORIDE SERPL-SCNC: 103 MMOL/L (ref 98–107)
CHOLEST SERPL-MCNC: 141 MG/DL (ref 0–200)
CO2 SERPL-SCNC: 26 MMOL/L (ref 22–29)
CREAT SERPL-MCNC: 1.06 MG/DL (ref 0.76–1.27)
CREAT UR-MCNC: 165.6 MG/DL
DEPRECATED RDW RBC AUTO: 41.7 FL (ref 37–54)
EGFRCR SERPLBLD CKD-EPI 2021: 81.3 ML/MIN/1.73
EOSINOPHIL # BLD AUTO: 0.29 10*3/MM3 (ref 0–0.4)
EOSINOPHIL NFR BLD AUTO: 4 % (ref 0.3–6.2)
ERYTHROCYTE [DISTWIDTH] IN BLOOD BY AUTOMATED COUNT: 12.7 % (ref 12.3–15.4)
FOLATE SERPL-MCNC: 15.6 NG/ML (ref 4.78–24.2)
GLOBULIN UR ELPH-MCNC: 3.1 GM/DL
GLUCOSE SERPL-MCNC: 121 MG/DL (ref 65–99)
HBA1C MFR BLD: 6.7 % (ref 4.8–5.6)
HCT VFR BLD AUTO: 46.2 % (ref 37.5–51)
HDLC SERPL-MCNC: 45 MG/DL (ref 40–60)
HGB BLD-MCNC: 15.5 G/DL (ref 13–17.7)
IMM GRANULOCYTES # BLD AUTO: 0.03 10*3/MM3 (ref 0–0.05)
IMM GRANULOCYTES NFR BLD AUTO: 0.4 % (ref 0–0.5)
LDLC SERPL CALC-MCNC: 66 MG/DL (ref 0–100)
LDLC/HDLC SERPL: 1.33 {RATIO}
LYMPHOCYTES # BLD AUTO: 3.18 10*3/MM3 (ref 0.7–3.1)
LYMPHOCYTES NFR BLD AUTO: 44.1 % (ref 19.6–45.3)
MAGNESIUM SERPL-MCNC: 2.1 MG/DL (ref 1.6–2.6)
MCH RBC QN AUTO: 30.3 PG (ref 26.6–33)
MCHC RBC AUTO-ENTMCNC: 33.5 G/DL (ref 31.5–35.7)
MCV RBC AUTO: 90.2 FL (ref 79–97)
MICROALBUMIN/CREAT UR: 10.9 MG/G (ref 0–29)
MONOCYTES # BLD AUTO: 1.03 10*3/MM3 (ref 0.1–0.9)
MONOCYTES NFR BLD AUTO: 14.3 % (ref 5–12)
NEUTROPHILS NFR BLD AUTO: 2.64 10*3/MM3 (ref 1.7–7)
NEUTROPHILS NFR BLD AUTO: 36.6 % (ref 42.7–76)
NRBC BLD AUTO-RTO: 0 /100 WBC (ref 0–0.2)
PLATELET # BLD AUTO: 162 10*3/MM3 (ref 140–450)
PMV BLD AUTO: 11.9 FL (ref 6–12)
POTASSIUM SERPL-SCNC: 4 MMOL/L (ref 3.5–5.2)
PROT SERPL-MCNC: 7.7 G/DL (ref 6–8.5)
PSA SERPL-MCNC: 0.59 NG/ML (ref 0–4)
RBC # BLD AUTO: 5.12 10*6/MM3 (ref 4.14–5.8)
SODIUM SERPL-SCNC: 142 MMOL/L (ref 136–145)
T3FREE SERPL-MCNC: 3.26 PG/ML (ref 2–4.4)
T4 FREE SERPL-MCNC: 1.02 NG/DL (ref 0.92–1.68)
TRIGL SERPL-MCNC: 180 MG/DL (ref 0–150)
TSH SERPL DL<=0.05 MIU/L-ACNC: 1.02 UIU/ML (ref 0.27–4.2)
VIT B12 BLD-MCNC: 842 PG/ML (ref 211–946)
VLDLC SERPL-MCNC: 30 MG/DL (ref 5–40)
WBC NRBC COR # BLD AUTO: 7.21 10*3/MM3 (ref 3.4–10.8)

## 2024-12-06 PROCEDURE — 85025 COMPLETE CBC W/AUTO DIFF WBC: CPT

## 2024-12-06 PROCEDURE — 84481 FREE ASSAY (FT-3): CPT

## 2024-12-06 PROCEDURE — 82607 VITAMIN B-12: CPT

## 2024-12-06 PROCEDURE — 83036 HEMOGLOBIN GLYCOSYLATED A1C: CPT

## 2024-12-06 PROCEDURE — G0103 PSA SCREENING: HCPCS

## 2024-12-06 PROCEDURE — 80053 COMPREHEN METABOLIC PANEL: CPT

## 2024-12-06 PROCEDURE — 36415 COLL VENOUS BLD VENIPUNCTURE: CPT

## 2024-12-06 PROCEDURE — 82043 UR ALBUMIN QUANTITATIVE: CPT

## 2024-12-06 PROCEDURE — 83735 ASSAY OF MAGNESIUM: CPT

## 2024-12-06 PROCEDURE — 82746 ASSAY OF FOLIC ACID SERUM: CPT

## 2024-12-06 PROCEDURE — 82570 ASSAY OF URINE CREATININE: CPT

## 2024-12-06 PROCEDURE — 80061 LIPID PANEL: CPT

## 2024-12-06 PROCEDURE — 84443 ASSAY THYROID STIM HORMONE: CPT

## 2024-12-06 PROCEDURE — 82306 VITAMIN D 25 HYDROXY: CPT

## 2024-12-06 PROCEDURE — 84439 ASSAY OF FREE THYROXINE: CPT

## 2024-12-07 NOTE — PROGRESS NOTES
"CHIEF COMPLAINT  Kristen Metzger presents to [facility] for follow-up of Follow-up (The patient is coming in for a 6 month follow up. The patient has no concerns).    HPI    58 yo pt with DM,HTN, HLD among otheres here for routine f/u and lab review    Records reviewed, meds reviewed in detail, labs reviewed with patient.  Plan of care is as follows below.  A1c 6.7 LDL 66 HDL 45  Vit D 25.6    PMFSH-Reviewed  ROS-no myalgias, chronic feet pain    Current Outpatient Medications:     atorvastatin (LIPITOR) 20 MG tablet, Take 1 tablet by mouth Daily., Disp: 90 tablet, Rfl: 3    metFORMIN ER (GLUCOPHAGE-XR) 500 MG 24 hr tablet, 4 tabs po daily or as directed, Disp: 360 tablet, Rfl: 3    multivitamin with minerals (CENTRUM ADULTS PO), Take 1 tablet by mouth Daily., Disp: , Rfl:     aspirin 81 MG chewable tablet, Chew 1 tablet Daily., Disp: , Rfl:     Vitamin D, Cholecalciferol, 50 MCG (2000 UT) capsule, Take 1 capsule by mouth Daily., Disp: 90 capsule, Rfl: 3   PFSH reviewed.      OBJECTIVE  Vital Signs  Vitals:    12/12/24 0723   BP: 120/80   BP Location: Left arm   Patient Position: Sitting   Cuff Size: Large Adult   Pulse: 61   Temp: 96.9 °F (36.1 °C)   TempSrc: Temporal   SpO2: 98%   Weight: 72.9 kg (160 lb 12.8 oz)   Height: 162.6 cm (64.02\")      Body mass index is 27.58 kg/m².    Physical Exam  Vitals and nursing note reviewed.   Constitutional:       Appearance: Normal appearance.   HENT:      Head: Normocephalic and atraumatic.      Nose: Nose normal.   Eyes:      Extraocular Movements: Extraocular movements intact.      Pupils: Pupils are equal, round, and reactive to light.   Cardiovascular:      Rate and Rhythm: Normal rate and regular rhythm.   Pulmonary:      Effort: Pulmonary effort is normal.      Breath sounds: Normal breath sounds.   Abdominal:      General: Abdomen is flat.      Palpations: Abdomen is soft.   Musculoskeletal:      Cervical back: Normal range of " "motion and neck supple.   Skin:     General: Skin is warm and dry.   Neurological:      General: No focal deficit present.      Mental Status: He is alert and oriented to person, place, and time.   Psychiatric:         Mood and Affect: Mood normal.         Behavior: Behavior normal.          RESULTS REVIEW  No results found for: \"PROBNP\", \"BNP\"  CMP          6/5/2024    06:47 12/6/2024    06:47   CMP   Glucose 141  121    BUN 18  16    Creatinine 0.84  1.06    EGFR 101.7  81.3    Sodium 141  142    Potassium 4.0  4.0    Chloride 104  103    Calcium 9.3  9.5    Total Protein 7.5  7.7    Albumin 4.6  4.6    Globulin 2.9  3.1    Total Bilirubin 0.8  0.9    Alkaline Phosphatase 59  67    AST (SGOT) 27  27    ALT (SGPT) 43  33    Albumin/Globulin Ratio 1.6  1.5    BUN/Creatinine Ratio 21.4  15.1    Anion Gap 8.0  13.0      CBC w/diff          6/5/2024    06:47 12/6/2024    06:47   CBC w/Diff   WBC 6.32  7.21    RBC 4.99  5.12    Hemoglobin 14.9  15.5    Hematocrit 45.9  46.2    MCV 92.0  90.2    MCH 29.9  30.3    MCHC 32.5  33.5    RDW 13.1  12.7    Platelets 149  162    Neutrophil Rel % 42.9  36.6    Immature Granulocyte Rel % 0.2  0.4    Lymphocyte Rel % 44.3  44.1    Monocyte Rel % 9.2  14.3    Eosinophil Rel % 2.8  4.0    Basophil Rel % 0.6  0.6       Lipid Panel          6/5/2024    06:47 12/6/2024    06:47   Lipid Panel   Total Cholesterol 159  141    Triglycerides 209  180    HDL Cholesterol 46  45    VLDL Cholesterol 35  30    LDL Cholesterol  78  66    LDL/HDL Ratio 1.55  1.33       Lab Results   Component Value Date    TSH 1.020 12/06/2024    TSH 0.708 06/05/2024    TSH 0.615 11/30/2023      Lab Results   Component Value Date    FREET4 1.02 12/06/2024    FREET4 1.11 06/05/2024    FREET4 1.04 11/30/2023      A1C Last 3 Results          6/5/2024    06:47 12/6/2024    06:47   HGBA1C Last 3 Results   Hemoglobin A1C 6.40  6.70       Lab Results   Component Value Date    EJMFBOZJ16 842 12/06/2024    UJMP70EW 25.6 (L) " 12/06/2024    MG 2.1 12/06/2024     PSA          12/6/2024    06:47   PSA   PSA 0.592       No Images in the past 120 days found..             ASSESSMENT & PLAN  Diagnoses and all orders for this visit:    1. Type 2 diabetes mellitus with hyperglycemia, without long-term current use of insulin (Primary)  Assessment & Plan:  Diabetes -A1c is 6.7 and higher than last visit     Plan-increase metformin XR to 500 mg 3 tablets daily-is only taking 2 tablets daily-currently no diarrhea   Start aspirin 81 mg 1 daily   Patient needs to continue to check his blood sugars  Goal A1c is less than 6  Follow-up in 6 months with lipids prior and A1c       Orders:  -     atorvastatin (LIPITOR) 20 MG tablet; Take 1 tablet by mouth Daily.  Dispense: 90 tablet; Refill: 3  -     metFORMIN ER (GLUCOPHAGE-XR) 500 MG 24 hr tablet; 4 tabs po daily or as directed  Dispense: 360 tablet; Refill: 3  -     Vitamin D, Cholecalciferol, 50 MCG (2000 UT) capsule; Take 1 capsule by mouth Daily.  Dispense: 90 capsule; Refill: 3  -     Comprehensive Metabolic Panel; Future  -     Lipid Panel; Future  -     CBC & Differential; Future  -     Vitamin B12; Future  -     Folate; Future  -     Vitamin D,25-Hydroxy; Future  -     Magnesium; Future  -     Microalbumin / Creatinine Urine Ratio - Urine, Clean Catch; Future  -     Hemoglobin A1c; Future  -     TSH+Free T4; Future  -     T3, Free; Future    2. Primary hypertension  Assessment & Plan:  Hypertension is stable.  Goal is less than 1130/80- currently not on any medications.    Plan-monitor blood pressure     If blood pressure starts to rise will add ACE or ARB once a diabetic    Orders:  -     atorvastatin (LIPITOR) 20 MG tablet; Take 1 tablet by mouth Daily.  Dispense: 90 tablet; Refill: 3  -     metFORMIN ER (GLUCOPHAGE-XR) 500 MG 24 hr tablet; 4 tabs po daily or as directed  Dispense: 360 tablet; Refill: 3  -     Vitamin D, Cholecalciferol, 50 MCG (2000 UT) capsule; Take 1 capsule by mouth Daily.   Dispense: 90 capsule; Refill: 3  -     Comprehensive Metabolic Panel; Future  -     Lipid Panel; Future  -     CBC & Differential; Future  -     Vitamin B12; Future  -     Folate; Future  -     Vitamin D,25-Hydroxy; Future  -     Magnesium; Future  -     Microalbumin / Creatinine Urine Ratio - Urine, Clean Catch; Future  -     Hemoglobin A1c; Future  -     TSH+Free T4; Future  -     T3, Free; Future    3. Pure hypercholesterolemia  Assessment & Plan:  Lipids stable LDL is less than 60-- LDL 66 HDL 45     Plan-continue atorvastatin 20 mg 1 daily no myalgias recheck lipids at next visit    Orders:  -     atorvastatin (LIPITOR) 20 MG tablet; Take 1 tablet by mouth Daily.  Dispense: 90 tablet; Refill: 3  -     metFORMIN ER (GLUCOPHAGE-XR) 500 MG 24 hr tablet; 4 tabs po daily or as directed  Dispense: 360 tablet; Refill: 3  -     Vitamin D, Cholecalciferol, 50 MCG (2000 UT) capsule; Take 1 capsule by mouth Daily.  Dispense: 90 capsule; Refill: 3  -     Comprehensive Metabolic Panel; Future  -     Lipid Panel; Future  -     CBC & Differential; Future  -     Vitamin B12; Future  -     Folate; Future  -     Vitamin D,25-Hydroxy; Future  -     Magnesium; Future  -     Microalbumin / Creatinine Urine Ratio - Urine, Clean Catch; Future  -     Hemoglobin A1c; Future  -     TSH+Free T4; Future  -     T3, Free; Future    4. Vitamin D deficiency  -     Vitamin D,25-Hydroxy; Future    5. Need for shingles vaccine  -     Shingrix Vaccine       Plan- 12/12/24  Patient Instructions   Increase metformin  mg  to 3 q d-only taking 2 daily--if A1c not < 6 will need to take 4 tabs daily  Goal A1c<6  Take  vit D 3 2000 u daily  Start ASA EC 81 mg daily  Shingrix vaccine today     FOLLOW UP  Return in about 6 months (around 6/12/2025) for Recheck.    Patient was given instructions and counseling regarding his condition or for health maintenance advice. Please see specific information pulled into the AVS if appropriate.

## 2024-12-12 ENCOUNTER — OFFICE VISIT (OUTPATIENT)
Dept: INTERNAL MEDICINE | Age: 59
End: 2024-12-12
Payer: COMMERCIAL

## 2024-12-12 VITALS
HEIGHT: 64 IN | TEMPERATURE: 96.9 F | DIASTOLIC BLOOD PRESSURE: 80 MMHG | OXYGEN SATURATION: 98 % | SYSTOLIC BLOOD PRESSURE: 120 MMHG | HEART RATE: 61 BPM | BODY MASS INDEX: 27.45 KG/M2 | WEIGHT: 160.8 LBS

## 2024-12-12 DIAGNOSIS — Z23 NEED FOR SHINGLES VACCINE: ICD-10-CM

## 2024-12-12 DIAGNOSIS — I10 PRIMARY HYPERTENSION: ICD-10-CM

## 2024-12-12 DIAGNOSIS — E55.9 VITAMIN D DEFICIENCY: ICD-10-CM

## 2024-12-12 DIAGNOSIS — E78.00 PURE HYPERCHOLESTEROLEMIA: ICD-10-CM

## 2024-12-12 DIAGNOSIS — E11.65 TYPE 2 DIABETES MELLITUS WITH HYPERGLYCEMIA, WITHOUT LONG-TERM CURRENT USE OF INSULIN: Primary | ICD-10-CM

## 2024-12-12 PROCEDURE — 90750 HZV VACC RECOMBINANT IM: CPT | Performed by: INTERNAL MEDICINE

## 2024-12-12 PROCEDURE — 99214 OFFICE O/P EST MOD 30 MIN: CPT | Performed by: INTERNAL MEDICINE

## 2024-12-12 RX ORDER — ASPIRIN 81 MG/1
81 TABLET, CHEWABLE ORAL DAILY
COMMUNITY

## 2024-12-12 RX ORDER — MULTIVIT-MIN/IRON/FOLIC ACID/K 18-600-40
1 CAPSULE ORAL DAILY
Qty: 90 CAPSULE | Refills: 3 | Status: SHIPPED | OUTPATIENT
Start: 2024-12-12

## 2024-12-12 RX ORDER — ATORVASTATIN CALCIUM 20 MG/1
20 TABLET, FILM COATED ORAL DAILY
Qty: 90 TABLET | Refills: 3 | Status: SHIPPED | OUTPATIENT
Start: 2024-12-12

## 2024-12-12 RX ORDER — METFORMIN HYDROCHLORIDE 500 MG/1
TABLET, EXTENDED RELEASE ORAL
Qty: 360 TABLET | Refills: 3 | Status: SHIPPED | OUTPATIENT
Start: 2024-12-12

## 2024-12-12 NOTE — ASSESSMENT & PLAN NOTE
Lipids stable LDL is less than 60-- LDL 66 HDL 45     Plan-continue atorvastatin 20 mg 1 daily no myalgias recheck lipids at next visit

## 2024-12-12 NOTE — PATIENT INSTRUCTIONS
Increase metformin  mg  to 3 q d-only taking 2 daily--if A1c not < 6 will need to take 4 tabs daily  Goal A1c<6  Take  vit D 3 2000 u daily  Start ASA EC 81 mg daily  Shingrix vaccine today

## 2024-12-12 NOTE — ASSESSMENT & PLAN NOTE
Diabetes -A1c is 6.7 and higher than last visit     Plan-increase metformin XR to 500 mg 3 tablets daily-is only taking 2 tablets daily-currently no diarrhea   Start aspirin 81 mg 1 daily   Patient needs to continue to check his blood sugars  Goal A1c is less than 6  Follow-up in 6 months with lipids prior and A1c

## 2025-01-07 DIAGNOSIS — E78.00 PURE HYPERCHOLESTEROLEMIA: ICD-10-CM

## 2025-01-07 DIAGNOSIS — E11.65 TYPE 2 DIABETES MELLITUS WITH HYPERGLYCEMIA, WITHOUT LONG-TERM CURRENT USE OF INSULIN: ICD-10-CM

## 2025-01-07 DIAGNOSIS — I10 PRIMARY HYPERTENSION: ICD-10-CM

## 2025-01-07 RX ORDER — METFORMIN HYDROCHLORIDE 500 MG/1
TABLET, EXTENDED RELEASE ORAL
Qty: 60 TABLET | Refills: 3 | Status: SHIPPED | OUTPATIENT
Start: 2025-01-07

## 2025-05-01 DIAGNOSIS — I10 PRIMARY HYPERTENSION: ICD-10-CM

## 2025-05-01 DIAGNOSIS — E78.00 PURE HYPERCHOLESTEROLEMIA: ICD-10-CM

## 2025-05-01 DIAGNOSIS — E11.65 TYPE 2 DIABETES MELLITUS WITH HYPERGLYCEMIA, WITHOUT LONG-TERM CURRENT USE OF INSULIN: ICD-10-CM

## 2025-05-01 RX ORDER — METFORMIN HYDROCHLORIDE 500 MG/1
1000 TABLET, EXTENDED RELEASE ORAL DAILY
Qty: 60 TABLET | Refills: 0 | Status: SHIPPED | OUTPATIENT
Start: 2025-05-01

## 2025-06-02 ENCOUNTER — TELEPHONE (OUTPATIENT)
Dept: INTERNAL MEDICINE | Age: 60
End: 2025-06-02
Payer: COMMERCIAL

## 2025-06-02 NOTE — TELEPHONE ENCOUNTER
Patient has an appt on June 9th to have labs done. His insurance didn't cover them all the last time he had them done and they had to pay $500. Would like the bare minimum done if possible. Please advise what labs are most important to have done.

## 2025-06-04 DIAGNOSIS — E78.00 PURE HYPERCHOLESTEROLEMIA: ICD-10-CM

## 2025-06-04 DIAGNOSIS — I10 PRIMARY HYPERTENSION: ICD-10-CM

## 2025-06-04 DIAGNOSIS — E11.65 TYPE 2 DIABETES MELLITUS WITH HYPERGLYCEMIA, WITHOUT LONG-TERM CURRENT USE OF INSULIN: ICD-10-CM

## 2025-06-04 RX ORDER — METFORMIN HYDROCHLORIDE 500 MG/1
1000 TABLET, EXTENDED RELEASE ORAL DAILY
Qty: 60 TABLET | Refills: 0 | Status: SHIPPED | OUTPATIENT
Start: 2025-06-04

## 2025-06-09 ENCOUNTER — LAB (OUTPATIENT)
Dept: LAB | Facility: HOSPITAL | Age: 60
End: 2025-06-09
Payer: COMMERCIAL

## 2025-06-09 DIAGNOSIS — I10 PRIMARY HYPERTENSION: ICD-10-CM

## 2025-06-09 DIAGNOSIS — E11.65 TYPE 2 DIABETES MELLITUS WITH HYPERGLYCEMIA, WITHOUT LONG-TERM CURRENT USE OF INSULIN: ICD-10-CM

## 2025-06-09 DIAGNOSIS — E55.9 VITAMIN D DEFICIENCY: ICD-10-CM

## 2025-06-09 DIAGNOSIS — Z23 NEED FOR SHINGLES VACCINE: ICD-10-CM

## 2025-06-09 DIAGNOSIS — E78.00 PURE HYPERCHOLESTEROLEMIA: ICD-10-CM

## 2025-06-09 LAB
ALBUMIN SERPL-MCNC: 4.6 G/DL (ref 3.5–5.2)
ALBUMIN/GLOB SERPL: 1.5 G/DL
ALP SERPL-CCNC: 65 U/L (ref 39–117)
ALT SERPL W P-5'-P-CCNC: 47 U/L (ref 1–41)
ANION GAP SERPL CALCULATED.3IONS-SCNC: 10 MMOL/L (ref 5–15)
AST SERPL-CCNC: 36 U/L (ref 1–40)
BASOPHILS # BLD AUTO: 0.05 10*3/MM3 (ref 0–0.2)
BASOPHILS NFR BLD AUTO: 0.7 % (ref 0–1.5)
BILIRUB SERPL-MCNC: 1 MG/DL (ref 0–1.2)
BUN SERPL-MCNC: 18 MG/DL (ref 6–20)
BUN/CREAT SERPL: 28.6 (ref 7–25)
CALCIUM SPEC-SCNC: 9.4 MG/DL (ref 8.6–10.5)
CHLORIDE SERPL-SCNC: 103 MMOL/L (ref 98–107)
CHOLEST SERPL-MCNC: 122 MG/DL (ref 0–200)
CO2 SERPL-SCNC: 26 MMOL/L (ref 22–29)
CREAT SERPL-MCNC: 0.63 MG/DL (ref 0.76–1.27)
DEPRECATED RDW RBC AUTO: 46.4 FL (ref 37–54)
EGFRCR SERPLBLD CKD-EPI 2021: 110.3 ML/MIN/1.73
EOSINOPHIL # BLD AUTO: 0.31 10*3/MM3 (ref 0–0.4)
EOSINOPHIL NFR BLD AUTO: 4.5 % (ref 0.3–6.2)
ERYTHROCYTE [DISTWIDTH] IN BLOOD BY AUTOMATED COUNT: 13.2 % (ref 12.3–15.4)
GLOBULIN UR ELPH-MCNC: 3.1 GM/DL
GLUCOSE SERPL-MCNC: 115 MG/DL (ref 65–99)
HBA1C MFR BLD: 6 % (ref 4.8–5.6)
HCT VFR BLD AUTO: 49.4 % (ref 37.5–51)
HDLC SERPL-MCNC: 46 MG/DL (ref 40–60)
HGB BLD-MCNC: 15.8 G/DL (ref 13–17.7)
IMM GRANULOCYTES # BLD AUTO: 0.01 10*3/MM3 (ref 0–0.05)
IMM GRANULOCYTES NFR BLD AUTO: 0.1 % (ref 0–0.5)
LDLC SERPL CALC-MCNC: 59 MG/DL (ref 0–100)
LDLC/HDLC SERPL: 1.28 {RATIO}
LYMPHOCYTES # BLD AUTO: 2.97 10*3/MM3 (ref 0.7–3.1)
LYMPHOCYTES NFR BLD AUTO: 43.4 % (ref 19.6–45.3)
MCH RBC QN AUTO: 30.3 PG (ref 26.6–33)
MCHC RBC AUTO-ENTMCNC: 32 G/DL (ref 31.5–35.7)
MCV RBC AUTO: 94.8 FL (ref 79–97)
MONOCYTES # BLD AUTO: 0.57 10*3/MM3 (ref 0.1–0.9)
MONOCYTES NFR BLD AUTO: 8.3 % (ref 5–12)
NEUTROPHILS NFR BLD AUTO: 2.93 10*3/MM3 (ref 1.7–7)
NEUTROPHILS NFR BLD AUTO: 43 % (ref 42.7–76)
NRBC BLD AUTO-RTO: 0 /100 WBC (ref 0–0.2)
PLATELET # BLD AUTO: 149 10*3/MM3 (ref 140–450)
PMV BLD AUTO: 11.7 FL (ref 6–12)
POTASSIUM SERPL-SCNC: 4 MMOL/L (ref 3.5–5.2)
PROT SERPL-MCNC: 7.7 G/DL (ref 6–8.5)
RBC # BLD AUTO: 5.21 10*6/MM3 (ref 4.14–5.8)
SODIUM SERPL-SCNC: 139 MMOL/L (ref 136–145)
TRIGL SERPL-MCNC: 86 MG/DL (ref 0–150)
VLDLC SERPL-MCNC: 17 MG/DL (ref 5–40)
WBC NRBC COR # BLD AUTO: 6.84 10*3/MM3 (ref 3.4–10.8)

## 2025-06-09 PROCEDURE — 80053 COMPREHEN METABOLIC PANEL: CPT

## 2025-06-09 PROCEDURE — 80061 LIPID PANEL: CPT

## 2025-06-09 PROCEDURE — 85025 COMPLETE CBC W/AUTO DIFF WBC: CPT

## 2025-06-09 PROCEDURE — 83036 HEMOGLOBIN GLYCOSYLATED A1C: CPT

## 2025-06-09 PROCEDURE — 36415 COLL VENOUS BLD VENIPUNCTURE: CPT

## 2025-06-11 PROBLEM — K57.30 DIVERTICULOSIS OF COLON: Status: ACTIVE | Noted: 2025-06-11

## 2025-06-11 NOTE — PROGRESS NOTES
CHIEF COMPLAINT  Kristen Metzger presents to [facility] to Primary Care Follow-Up (59 year old male here for 6 month follow up.  Patient needs labs reviewed.  Patient has no new issues or concerns )     HPI  Here with daughter Anli  History of Present Illness  The patient is a 59-year-old male with underlying diabetes, hypertension, hyperlipidemia, and allergies, here for a routine follow-up and  lab review.    He reports experiencing diarrhea approximately once a week, which he attributes to his metformin medication. However, he does not find this bothersome. He monitors his blood glucose levels at home, with the lowest recorded level being 103 last week. He also recalls an episode of hypoglycemia last Friday, characterized by sweating and weakness, which was alleviated by consuming peanuts and water. He has been adhering to a healthy diet, abstaining from rice for three days last week. He has not had an eye examination within the past year. He is currently on a regimen of metformin 500 mg twice daily.    He is taking vitamin D 2000 units daily and multivitamins. He is not taking aspirin.    He reports no leg swelling. He has been experiencing neck pain since last week, which he believes may be due to his sleeping position or blood pressure. His occupation involves extensive standing as he works as a [employer]. He has recently started using a new pillow.    MEDICATIONS  Metformin, vitamin D, multivitamin      Records reviewed, meds reviewed in detail, labs reviewed with patient.  Plan of care is as follows below.    [facility]-Reviewed  ROS-no soa    Past History:  Allergies: Patient has no known allergies.   Medical History: has a past medical history of Hyperlipidemia and Prediabetes (07/01/2023).   Surgical History: has a past surgical history that includes Colonoscopy (N/A, 2/11/2022).   Family History: family history includes Diabetes in his brother and mother; Hypertension in his  mother.   Social History: reports that he quit smoking about 8 years ago. His smoking use included cigarettes. He started smoking about 55 years ago. He has a 11.8 pack-year smoking history. He has never used smokeless tobacco. He reports current alcohol use. He reports that he does not use drugs.  Social History     Social History Narrative   • Not on file         Current Outpatient Medications:   •  aspirin 81 MG chewable tablet, Chew 1 tablet Daily., Disp: , Rfl:   •  atorvastatin (LIPITOR) 20 MG tablet, Take 1 tablet by mouth Daily., Disp: 90 tablet, Rfl: 3  •  multivitamin with minerals (CENTRUM ADULTS PO), Take 1 tablet by mouth Daily., Disp: , Rfl:   •  Vitamin D, Cholecalciferol, 50 MCG (2000 UT) capsule, Take 1 capsule by mouth Daily., Disp: 90 capsule, Rfl: 3  •  metFORMIN ER (GLUCOPHAGE-XR) 500 MG 24 hr tablet, One tab po q d--hold if BS <100, Disp: 30 tablet, Rfl: 5     OBJECTIVE  Vital Signs  Vitals:    06/16/25 0733   BP: 122/82   BP Location: Left arm   Patient Position: Sitting   Cuff Size: Small Adult   Pulse: 83   Temp: 98 °F (36.7 °C)   SpO2: 96%   Weight: 70.1 kg (154 lb 9.6 oz)      Body mass index is 26.52 kg/m².    Physical Exam  Neck was examined.  Lungs were auscultated.    Vital Signs  Patient's weight is 154 pounds. Blood pressure is 122/82.      Physical Exam  Vitals and nursing note reviewed.   Constitutional:       Appearance: Normal appearance.   HENT:      Head: Normocephalic and atraumatic.      Nose: Nose normal.   Eyes:      Extraocular Movements: Extraocular movements intact.      Pupils: Pupils are equal, round, and reactive to light.   Cardiovascular:      Rate and Rhythm: Normal rate and regular rhythm.   Pulmonary:      Effort: Pulmonary effort is normal.      Breath sounds: Normal breath sounds.   Abdominal:      General: Abdomen is flat.      Palpations: Abdomen is soft.   Musculoskeletal:      Cervical back: Normal range of motion and neck supple.   Skin:     General: Skin  "is warm and dry.   Neurological:      General: No focal deficit present.      Mental Status: He is alert and oriented to person, place, and time.   Psychiatric:         Mood and Affect: Mood normal.         Behavior: Behavior normal.       RESULTS REVIEW  No results found for: \"PROBNP\", \"BNP\"  CMP          12/6/2024    06:47 6/9/2025    06:37   CMP   Glucose 121  115    BUN 16  18.0    Creatinine 1.06  0.63    EGFR 81.3  110.3    Sodium 142  139    Potassium 4.0  4.0    Chloride 103  103    Calcium 9.5  9.4    Total Protein 7.7  7.7    Albumin 4.6  4.6    Globulin 3.1  3.1    Total Bilirubin 0.9  1.0    Alkaline Phosphatase 67  65    AST (SGOT) 27  36    ALT (SGPT) 33  47    Albumin/Globulin Ratio 1.5  1.5    BUN/Creatinine Ratio 15.1  28.6    Anion Gap 13.0  10.0      CBC w/diff          12/6/2024    06:47 6/9/2025    06:37   CBC w/Diff   WBC 7.21  6.84    RBC 5.12  5.21    Hemoglobin 15.5  15.8    Hematocrit 46.2  49.4    MCV 90.2  94.8    MCH 30.3  30.3    MCHC 33.5  32.0    RDW 12.7  13.2    Platelets 162  149    Neutrophil Rel % 36.6  43.0    Immature Granulocyte Rel % 0.4  0.1    Lymphocyte Rel % 44.1  43.4    Monocyte Rel % 14.3  8.3    Eosinophil Rel % 4.0  4.5    Basophil Rel % 0.6  0.7       Lipid Panel          12/6/2024    06:47 6/9/2025    06:37   Lipid Panel   Total Cholesterol 141  122    Triglycerides 180  86    HDL Cholesterol 45  46    VLDL Cholesterol 30  17    LDL Cholesterol  66  59    LDL/HDL Ratio 1.33  1.28       Lab Results   Component Value Date    TSH 1.020 12/06/2024    TSH 0.708 06/05/2024    TSH 0.615 11/30/2023      Lab Results   Component Value Date    FREET4 1.02 12/06/2024    FREET4 1.11 06/05/2024    FREET4 1.04 11/30/2023      A1C Last 3 Results          12/6/2024    06:47 6/9/2025    06:37   HGBA1C Last 3 Results   Hemoglobin A1C 6.70  6.00       PSA          12/6/2024    06:47   PSA   PSA 0.592       No Images in the past 120 days found..       Results  Laboratory Studies  A1c " is 6. LDL is 59, HDL 46, and .             ASSESSMENT & PLAN  Diagnoses and all orders for this visit:    1. Type 2 diabetes mellitus with hyperglycemia, without long-term current use of insulin (Primary)  -     metFORMIN ER (GLUCOPHAGE-XR) 500 MG 24 hr tablet; One tab po q d--hold if BS <100  Dispense: 30 tablet; Refill: 5  -     Comprehensive Metabolic Panel; Future  -     CBC & Differential; Future  -     Vitamin B12; Future  -     Folate; Future  -     Vitamin D,25-Hydroxy; Future  -     Microalbumin / Creatinine Urine Ratio - Urine, Clean Catch; Future  -     Hemoglobin A1c; Future    2. Pure hypercholesterolemia  -     Comprehensive Metabolic Panel; Future  -     CBC & Differential; Future  -     Vitamin B12; Future  -     Folate; Future  -     Vitamin D,25-Hydroxy; Future  -     Microalbumin / Creatinine Urine Ratio - Urine, Clean Catch; Future  -     Hemoglobin A1c; Future    3. Primary hypertension  -     Comprehensive Metabolic Panel; Future  -     CBC & Differential; Future  -     Vitamin B12; Future  -     Folate; Future  -     Vitamin D,25-Hydroxy; Future  -     Microalbumin / Creatinine Urine Ratio - Urine, Clean Catch; Future  -     Hemoglobin A1c; Future    4. Seasonal allergic rhinitis due to pollen  -     Comprehensive Metabolic Panel; Future  -     CBC & Differential; Future  -     Vitamin B12; Future  -     Folate; Future  -     Vitamin D,25-Hydroxy; Future  -     Microalbumin / Creatinine Urine Ratio - Urine, Clean Catch; Future  -     Hemoglobin A1c; Future    5. Diverticulosis of colon  -     Comprehensive Metabolic Panel; Future  -     CBC & Differential; Future  -     Vitamin B12; Future  -     Folate; Future  -     Vitamin D,25-Hydroxy; Future  -     Microalbumin / Creatinine Urine Ratio - Urine, Clean Catch; Future  -     Hemoglobin A1c; Future    6. Screen for colon cancer  Overview:  Had colonoscopy 2022 revealing diverticula repeat colonoscopy not due for another 10 years in  2032    Assessment & Plan:  Ages 40-64 Counseling/Anticipatory Guidance Discussed: nutrition, physical activity, healthy weight, injury prevention, misuse of tobacco, alcohol and drugs, contraception, dental health, mental health, immunizations, screenings, and use of seatbelt    Orders:  -     Comprehensive Metabolic Panel; Future  -     CBC & Differential; Future  -     Vitamin B12; Future  -     Folate; Future  -     Vitamin D,25-Hydroxy; Future  -     Microalbumin / Creatinine Urine Ratio - Urine, Clean Catch; Future  -     Hemoglobin A1c; Future           Assessment & Plan  1. Diabetes Mellitus.  His A1c has improved to 6 from 6.7. He experiences occasional hypoglycemic symptoms, such as sweating and weakness, but manages these by consuming snacks like peanuts. He is advised to continue his current diet and exercise regimen. The metformin XR dosage will be reduced to 500 mg once daily due to his improved glycemic control and weight loss. If his blood sugar levels remain stable, the frequency may be further reduced to every other day.    2. Hypertension.  His blood pressure is well-controlled at 122/82 mmHg. He is advised to continue monitoring his blood pressure and maintain a low-salt diet.  Goal BP <130/80-not on any ACE-I or ARB at present    3. Hyperlipidemia.  His LDL has improved to 59. He is advised to continue his current cholesterol medication.    4. Vitamin D Deficiency.  He is advised to continue taking vitamin D 2000 units daily as his levels were previously low.    5. Health Maintenance.  He is advised to continue taking multivitamins. He is also advised to undergo an annual eye examination. Lab work will be ordered to monitor A1c, magnesium, vitamin D, and B12 levels.    Follow-up  The patient will follow up in 6 months.        BMI is >= 25 and <30. (Overweight) The following options were offered after discussion;: weight loss educational material (shared in after visit summary), exercise  counseling/recommendations, and nutrition counseling/recommendations       Patient Instructions   Decrease metformin  mg to one daily  Goal A1c<6  If BS<100 can hold metformin XR    Annual eye exam  Foot exam due at next visit   IF BP >130/80 consider adding ARB or ACE-I for renal protection    Patient or patient representative verbalized consent for the use of Ambient Listening during the visit with  Gissel Akhtar MD for chart documentation. 6/16/2025  09:10 EDT    FOLLOW UP  Return in about 6 months (around 12/16/2025) for Recheck, Annual physical. And labs prior    Patient was given instructions and counseling regarding his condition or for health maintenance advice. Please see specific information pulled into the AVS if appropriate.

## 2025-06-16 ENCOUNTER — OFFICE VISIT (OUTPATIENT)
Dept: INTERNAL MEDICINE | Age: 60
End: 2025-06-16
Payer: COMMERCIAL

## 2025-06-16 VITALS
OXYGEN SATURATION: 96 % | HEART RATE: 83 BPM | TEMPERATURE: 98 F | DIASTOLIC BLOOD PRESSURE: 82 MMHG | BODY MASS INDEX: 26.52 KG/M2 | WEIGHT: 154.6 LBS | SYSTOLIC BLOOD PRESSURE: 122 MMHG

## 2025-06-16 DIAGNOSIS — K57.30 DIVERTICULOSIS OF COLON: ICD-10-CM

## 2025-06-16 DIAGNOSIS — E11.65 TYPE 2 DIABETES MELLITUS WITH HYPERGLYCEMIA, WITHOUT LONG-TERM CURRENT USE OF INSULIN: Primary | ICD-10-CM

## 2025-06-16 DIAGNOSIS — I10 PRIMARY HYPERTENSION: ICD-10-CM

## 2025-06-16 DIAGNOSIS — E78.00 PURE HYPERCHOLESTEROLEMIA: ICD-10-CM

## 2025-06-16 DIAGNOSIS — Z12.11 SCREEN FOR COLON CANCER: ICD-10-CM

## 2025-06-16 DIAGNOSIS — J30.1 SEASONAL ALLERGIC RHINITIS DUE TO POLLEN: ICD-10-CM

## 2025-06-16 RX ORDER — METFORMIN HYDROCHLORIDE 500 MG/1
TABLET, EXTENDED RELEASE ORAL
Qty: 30 TABLET | Refills: 5 | Status: SHIPPED | OUTPATIENT
Start: 2025-06-16

## 2025-06-16 NOTE — PATIENT INSTRUCTIONS
Decrease metformin  mg to one daily  Goal A1c<6  If BS<100 can hold metformin XR    Annual eye exam  Foot exam due at next visit

## 2025-06-30 DIAGNOSIS — E11.65 TYPE 2 DIABETES MELLITUS WITH HYPERGLYCEMIA, WITHOUT LONG-TERM CURRENT USE OF INSULIN: ICD-10-CM

## 2025-06-30 RX ORDER — METFORMIN HYDROCHLORIDE 500 MG/1
1000 TABLET, EXTENDED RELEASE ORAL DAILY
Qty: 60 TABLET | Refills: 0 | OUTPATIENT
Start: 2025-06-30

## (undated) DEVICE — COLON KIT: Brand: MEDLINE INDUSTRIES, INC.

## (undated) DEVICE — Device: Brand: DEFENDO AIR/WATER/SUCTION AND BIOPSY VALVE

## (undated) DEVICE — SOL IRRG H2O PL/BG 1000ML STRL